# Patient Record
Sex: MALE | Race: BLACK OR AFRICAN AMERICAN
[De-identification: names, ages, dates, MRNs, and addresses within clinical notes are randomized per-mention and may not be internally consistent; named-entity substitution may affect disease eponyms.]

---

## 2018-09-27 ENCOUNTER — HOSPITAL ENCOUNTER (INPATIENT)
Dept: HOSPITAL 72 - EMR | Age: 73
LOS: 5 days | Discharge: HOME | DRG: 391 | End: 2018-10-02
Payer: MEDICARE

## 2018-09-27 VITALS — WEIGHT: 173 LBS | BODY MASS INDEX: 24.22 KG/M2 | HEIGHT: 71 IN

## 2018-09-27 VITALS — DIASTOLIC BLOOD PRESSURE: 109 MMHG | SYSTOLIC BLOOD PRESSURE: 197 MMHG

## 2018-09-27 VITALS — DIASTOLIC BLOOD PRESSURE: 100 MMHG | SYSTOLIC BLOOD PRESSURE: 165 MMHG

## 2018-09-27 VITALS — SYSTOLIC BLOOD PRESSURE: 165 MMHG | DIASTOLIC BLOOD PRESSURE: 93 MMHG

## 2018-09-27 VITALS — SYSTOLIC BLOOD PRESSURE: 162 MMHG | DIASTOLIC BLOOD PRESSURE: 96 MMHG

## 2018-09-27 VITALS — SYSTOLIC BLOOD PRESSURE: 160 MMHG | DIASTOLIC BLOOD PRESSURE: 102 MMHG

## 2018-09-27 VITALS — SYSTOLIC BLOOD PRESSURE: 192 MMHG | DIASTOLIC BLOOD PRESSURE: 109 MMHG

## 2018-09-27 DIAGNOSIS — M47.892: ICD-10-CM

## 2018-09-27 DIAGNOSIS — K59.00: ICD-10-CM

## 2018-09-27 DIAGNOSIS — R11.2: ICD-10-CM

## 2018-09-27 DIAGNOSIS — T62.91XA: ICD-10-CM

## 2018-09-27 DIAGNOSIS — F41.9: ICD-10-CM

## 2018-09-27 DIAGNOSIS — F32.9: ICD-10-CM

## 2018-09-27 DIAGNOSIS — Z98.1: ICD-10-CM

## 2018-09-27 DIAGNOSIS — N17.0: ICD-10-CM

## 2018-09-27 DIAGNOSIS — I10: ICD-10-CM

## 2018-09-27 DIAGNOSIS — Z79.02: ICD-10-CM

## 2018-09-27 DIAGNOSIS — I62.03: ICD-10-CM

## 2018-09-27 DIAGNOSIS — M50.10: ICD-10-CM

## 2018-09-27 DIAGNOSIS — J44.9: ICD-10-CM

## 2018-09-27 DIAGNOSIS — R10.9: ICD-10-CM

## 2018-09-27 DIAGNOSIS — Z86.010: ICD-10-CM

## 2018-09-27 DIAGNOSIS — K57.90: ICD-10-CM

## 2018-09-27 DIAGNOSIS — Z88.6: ICD-10-CM

## 2018-09-27 DIAGNOSIS — A08.4: Primary | ICD-10-CM

## 2018-09-27 DIAGNOSIS — E86.0: ICD-10-CM

## 2018-09-27 DIAGNOSIS — B20: ICD-10-CM

## 2018-09-27 LAB
ADD MANUAL DIFF: YES
ALBUMIN SERPL-MCNC: 4.7 G/DL (ref 3.4–5)
ALBUMIN/GLOB SERPL: 1 {RATIO} (ref 1–2.7)
ALP SERPL-CCNC: 108 U/L (ref 46–116)
ALT SERPL-CCNC: 27 U/L (ref 12–78)
ANION GAP SERPL CALC-SCNC: 9 MMOL/L (ref 5–15)
APPEARANCE UR: CLEAR
APTT BLD: 27 SEC (ref 23–33)
APTT PPP: (no result) S
AST SERPL-CCNC: 23 U/L (ref 15–37)
BILIRUB SERPL-MCNC: 0.6 MG/DL (ref 0.2–1)
BUN SERPL-MCNC: 13 MG/DL (ref 7–18)
CALCIUM SERPL-MCNC: 9.8 MG/DL (ref 8.5–10.1)
CHLORIDE SERPL-SCNC: 95 MMOL/L (ref 98–107)
CO2 SERPL-SCNC: 28 MMOL/L (ref 21–32)
CREAT SERPL-MCNC: 0.8 MG/DL (ref 0.55–1.3)
ERYTHROCYTE [DISTWIDTH] IN BLOOD BY AUTOMATED COUNT: 11.2 % (ref 11.6–14.8)
GLOBULIN SER-MCNC: 4.6 G/DL
GLUCOSE UR STRIP-MCNC: (no result) MG/DL
HCT VFR BLD CALC: 49.4 % (ref 42–52)
HGB BLD-MCNC: 16.4 G/DL (ref 14.2–18)
INR PPP: 1 (ref 0.9–1.1)
KETONES UR QL STRIP: (no result)
LEUKOCYTE ESTERASE UR QL STRIP: NEGATIVE
MCV RBC AUTO: 99 FL (ref 80–99)
NITRITE UR QL STRIP: NEGATIVE
PH UR STRIP: 7 [PH] (ref 4.5–8)
PLATELET # BLD: 234 K/UL (ref 150–450)
POTASSIUM SERPL-SCNC: 3.6 MMOL/L (ref 3.5–5.1)
PROT UR QL STRIP: (no result)
RBC # BLD AUTO: 4.99 M/UL (ref 4.7–6.1)
SODIUM SERPL-SCNC: 132 MMOL/L (ref 136–145)
SP GR UR STRIP: 1.01 (ref 1–1.03)
UROBILINOGEN UR-MCNC: NORMAL MG/DL (ref 0–1)
WBC # BLD AUTO: 10.8 K/UL (ref 4.8–10.8)

## 2018-09-27 PROCEDURE — 96361 HYDRATE IV INFUSION ADD-ON: CPT

## 2018-09-27 PROCEDURE — 80053 COMPREHEN METABOLIC PANEL: CPT

## 2018-09-27 PROCEDURE — 87081 CULTURE SCREEN ONLY: CPT

## 2018-09-27 PROCEDURE — 93005 ELECTROCARDIOGRAM TRACING: CPT

## 2018-09-27 PROCEDURE — 83880 ASSAY OF NATRIURETIC PEPTIDE: CPT

## 2018-09-27 PROCEDURE — 82150 ASSAY OF AMYLASE: CPT

## 2018-09-27 PROCEDURE — 86140 C-REACTIVE PROTEIN: CPT

## 2018-09-27 PROCEDURE — 85610 PROTHROMBIN TIME: CPT

## 2018-09-27 PROCEDURE — 83690 ASSAY OF LIPASE: CPT

## 2018-09-27 PROCEDURE — 80048 BASIC METABOLIC PNL TOTAL CA: CPT

## 2018-09-27 PROCEDURE — 99285 EMERGENCY DEPT VISIT HI MDM: CPT

## 2018-09-27 PROCEDURE — 83735 ASSAY OF MAGNESIUM: CPT

## 2018-09-27 PROCEDURE — 84484 ASSAY OF TROPONIN QUANT: CPT

## 2018-09-27 PROCEDURE — 85730 THROMBOPLASTIN TIME PARTIAL: CPT

## 2018-09-27 PROCEDURE — 82607 VITAMIN B-12: CPT

## 2018-09-27 PROCEDURE — 76700 US EXAM ABDOM COMPLETE: CPT

## 2018-09-27 PROCEDURE — 94664 DEMO&/EVAL PT USE INHALER: CPT

## 2018-09-27 PROCEDURE — 74176 CT ABD & PELVIS W/O CONTRAST: CPT

## 2018-09-27 PROCEDURE — 80061 LIPID PANEL: CPT

## 2018-09-27 PROCEDURE — 84550 ASSAY OF BLOOD/URIC ACID: CPT

## 2018-09-27 PROCEDURE — 85007 BL SMEAR W/DIFF WBC COUNT: CPT

## 2018-09-27 PROCEDURE — 36415 COLL VENOUS BLD VENIPUNCTURE: CPT

## 2018-09-27 PROCEDURE — 82977 ASSAY OF GGT: CPT

## 2018-09-27 PROCEDURE — 72148 MRI LUMBAR SPINE W/O DYE: CPT

## 2018-09-27 PROCEDURE — 96375 TX/PRO/DX INJ NEW DRUG ADDON: CPT

## 2018-09-27 PROCEDURE — 84443 ASSAY THYROID STIM HORMONE: CPT

## 2018-09-27 PROCEDURE — 83036 HEMOGLOBIN GLYCOSYLATED A1C: CPT

## 2018-09-27 PROCEDURE — 85025 COMPLETE CBC W/AUTO DIFF WBC: CPT

## 2018-09-27 PROCEDURE — 84100 ASSAY OF PHOSPHORUS: CPT

## 2018-09-27 PROCEDURE — 76770 US EXAM ABDO BACK WALL COMP: CPT

## 2018-09-27 PROCEDURE — 81003 URINALYSIS AUTO W/O SCOPE: CPT

## 2018-09-27 PROCEDURE — 96374 THER/PROPH/DIAG INJ IV PUSH: CPT

## 2018-09-27 PROCEDURE — 82550 ASSAY OF CK (CPK): CPT

## 2018-09-27 RX ADMIN — HEPARIN SODIUM SCH UNITS: 5000 INJECTION INTRAVENOUS; SUBCUTANEOUS at 20:20

## 2018-09-27 RX ADMIN — DEXTROSE AND SODIUM CHLORIDE SCH MLS/HR: 5; .45 INJECTION, SOLUTION INTRAVENOUS at 15:32

## 2018-09-27 NOTE — GENERAL PROGRESS NOTE
Assessment/Plan


Assessment/Plan


(1) Chronic subdural hematoma


(2) DDD (degenerative disc disease), lumbar


(3) DDD (degenerative disc disease), cervical


(4) Lumbar herniated disc


(5) Lumbar radiculopathy


(6) Lumbar spondylosis


(7) Cervical spondylosis


(8) Failed back surgical syndrome


Assessment & Plan:  Pt will continued on Dilaudid. Pt was d/w Dr. Dixon and 

he concurred.








Subjective


Date patient seen:  Sep 27, 2018


Time patient seen:  21:28


Allergies:  


Coded Allergies:  


     ACETAMINOPHEN (Verified  Allergy, Unknown, 3/24/10)


     ASPIRIN (Verified  Allergy, Unknown, 12/7/09)


     HYDROCODONE (Verified  Allergy, Unknown, 3/24/10)


     MORPHINE (Verified  Allergy, Unknown, 12/7/09)


Subjective


Constitutional:  Denies: chills, diaphoresis, fever, malaise, no symptoms, other

, weakness


HEENT:  Denies: blurred vision, double vision, ear discharge, ear pain, eye pain

, mouth pain, mouth swelling, no symptoms, nose congestion, nose pain, other, 

tearing, throat pain, throat swelling


Cardiovascular:  Denies: chest pain, edema, irregular heart rate, 

lightheadedness, no symptoms, other, palpitations, syncope


Respiratory:  Denies: SOB at rest, SOB with excertion, cough, no symptoms, 

orthopnea, other, shortness of breath, sputum, stridor, wheezing


Gastrointestinal/Abdominal:  Denies: abdomen distended, abdominal pain, black 

stools, blood in stool, constipated, diarrhea, difficulty swallowing, nausea, 

no symptoms, other, poor appetite, poor fluid intake, rectal bleeding, tarry 

stools, vomiting


Genitourinary:  Denies: burning, discharge, flank pain, frequency, hematuria, 

incontinence, no symptoms, other, pain, urgency


Neurologic/Psychiatric:  Denies: anxiety, depressed, emotional problems, 

headache, no symptoms, numbness, other, paresthesia, pre-existing deficit, 

seizure, tingling, tremors, weakness


Endocrine:  Denies: excessive sweating, flushing, increased hunger, increased 

thirst, increased urine, intolerance to cold, intolerance to heat, no symptoms, 

other, unexplained weight gain, unexplained weight loss


Hematologic/Lymphatic:  Denies: anemia, easy bleeding, easy bruising, no 

symptoms, other





Subjective


Pt is a known patient admitted under the care of Dr. Arreola with chronic pain. 

Started on Dilaudid 2mg IV Q3H PRN which pt is having adequate pain control on.





Objective





Last 24 Hour Vital Signs








  Date Time  Temp Pulse Resp B/P (MAP) Pulse Ox O2 Delivery O2 Flow Rate FiO2


 


9/27/18 17:08    165/93    


 


9/27/18 16:02 99.2       


 


9/27/18 16:00 99.2 80 20 165/93 (117) 99   





 99.2       


 


9/27/18 15:32 99.4       


 


9/27/18 14:21      Room Air  


 


9/27/18 14:19 99.4 82 19 165/100 (121) 99   





 99.4       


 


9/27/18 13:49      Room Air  


 


9/27/18 12:59 98.4 85 15 160/102 100 Room Air  





 98.4       


 


9/27/18 12:53 98.4 85 15 160/102 100 Room Air  





 98.4       


 


9/27/18 12:26    174/96    


 


9/27/18 12:13  88  192/113    


 


9/27/18 12:00 98.4 88 18 192/109 100 Room Air  





 98.4       


 


9/27/18 11:36  106  197/109    


 


9/27/18 11:13 98.4       


 


9/27/18 11:13 98.4       


 


9/27/18 11:13 98.4       


 


9/27/18 10:15 98.4       


 


9/27/18 10:05 98.4 106 18 197/109 98 Room Air  





 98.4       


 


9/27/18 09:28 98.5 115 18 199/109 98 Room Air  





 98.4       








Laboratory Tests


9/27/18 10:20: 


White Blood Count 10.8, Red Blood Count 4.99, Hemoglobin 16.4, Hematocrit 49.4, 

Mean Corpuscular Volume 99, Mean Corpuscular Hemoglobin 32.9H, Mean Corpuscular 

Hemoglobin Concent 33.2, Red Cell Distribution Width 11.2L, Platelet Count 234, 

Mean Platelet Volume 5.2L, Neutrophils (%) (Auto) , Lymphocytes (%) (Auto) , 

Monocytes (%) (Auto) , Eosinophils (%) (Auto) , Basophils (%) (Auto) , 

Differential Total Cells Counted 100, Neutrophils % (Manual) 89H, Lymphocytes % 

(Manual) 8L, Monocytes % (Manual) 3, Eosinophils % (Manual) 0, Basophils % (

Manual) 0, Band Neutrophils 0, Platelet Estimate Adequate, Platelet Morphology 

Normal, Red Blood Cell Morphology Normal, Prothrombin Time 10.1, Prothromb Time 

International Ratio 1.0, Activated Partial Thromboplast Time 27, Sodium Level 

132L, Potassium Level 3.6, Chloride Level 95L, Carbon Dioxide Level 28, Anion 

Gap 9, Blood Urea Nitrogen 13, Creatinine 0.8, Estimat Glomerular Filtration 

Rate , Glucose Level 145H, Calcium Level 9.8, Total Bilirubin 0.6, Aspartate 

Amino Transf (AST/SGOT) 23, Alanine Aminotransferase (ALT/SGPT) 27, Alkaline 

Phosphatase 108, Troponin I 0.038, Total Protein 9.3H, Albumin 4.7, Globulin 4.6

, Albumin/Globulin Ratio 1.0, Lipase 46L


9/27/18 12:15: 


Urine Color Pale yellow, Urine Appearance Clear, Urine pH 7, Urine Specific 

Gravity 1.010, Urine Protein 3+H, Urine Glucose (UA) 2+H, Urine Ketones 3+H, 

Urine Blood 5+H, Urine Nitrite Negative, Urine Bilirubin Negative, Urine 

Urobilinogen Normal, Urine Leukocyte Esterase Negative, Urine RBC 10-15H, Urine 

WBC 0, Urine Squamous Epithelial Cells None, Urine Bacteria None


Height (Feet):  5


Height (Inches):  11.00


Weight (Pounds):  173


Objective


General Appearance:  no apparent distress, alert


EENT:  PERRL/EOMI


Neck:  normal alignment, supple


Cardiovascular:  normal rate, regular rhythm


Respiratory/Chest:  lungs clear, normal breath sounds


Abdomen:  non tender, soft


Extremities:  non-tender


Neurologic:  alert, oriented x 3


Skin:  warm/dry











Jean Beltran Sep 27, 2018 21:25

## 2018-09-27 NOTE — EMERGENCY ROOM REPORT
History of Present Illness


General


Chief Complaint:  Vomiting


Source:  Patient, Medical Record





Present Illness


HPI


Patient presents emergency department today complaint generalized weakness, 

vomiting, decreased by mouth intake and abdominal pain.  Patient states that he 

has had symptoms like this in the past.  He is uncertain where this came from.  

He was told that he has history of gallstones.  Patient denies any fever chest 

pain or shortness of breath but appears very weak.  He states that he's really 

too weak to eat or walk.  His primary care physician is Dr. Arreola.  No other 

complaints are noted.  Symptoms noted to be severe.No other modifying factors.  

No other associated signs and symptoms.  No other complaints were noted.


Allergies:  


Coded Allergies:  


     ACETAMINOPHEN (Verified  Allergy, Unknown, 3/24/10)


     ASPIRIN (Verified  Allergy, Unknown, 12/7/09)


     HYDROCODONE (Verified  Allergy, Unknown, 3/24/10)


     MORPHINE (Verified  Allergy, Unknown, 12/7/09)





Patient History


Past Medical History:  HTN, COPD, HIV


Past Surgical History:  other - Back surgery, subdural hematoma


Pertinent Family History:  none


Social History:  Denies: smoking, alcohol use, drug use


Reviewed Nursing Documentation:  PMH: Agreed; PSxH: Agreed





Nursing Documentation-PMH


Past Medical History:  No History, Except For


Hx Cardiac Problems:  Yes - HIV


Hx Hypertension:  Yes


Hx Pacemaker:  No


Hx Asthma:  Yes


Hx COPD:  Yes


Hx Diabetes:  No


Hx Cancer:  No


Hx Gastrointestinal Problems:  No


Hx Dialysis:  No


Hx Neurological Problems:  Yes - Back surgery


Hx Cerebrovascular Accident:  Yes


Hx Dementia:  No


Hx Alzheimer's Disease:  No


Hx Parkinson's Disease:  No


Hx Seizures:  No


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


Hx Weakness:  Yes


Hx Fatigue:  Yes


Hx Brain Shunt:  Yes





Review of Systems


All Other Systems:  negative except mentioned in HPI





Physical Exam





Vital Signs








  Date Time  Temp Pulse Resp B/P (MAP) Pulse Ox O2 Delivery O2 Flow Rate FiO2


 


9/27/18 09:28 98.5 115 18 199/109 98 Room Air  





 98.4       








Sp02 EP Interpretation:  reviewed, normal


General Appearance:  alert, mild distress, lethargic, thin, Chronically Ill


Head:  normocephalic, atraumatic


Eyes:  bilateral eye normal inspection


ENT:  normal ENT inspection, hearing grossly normal, normal voice, other - Dry 

mucous membranes


Neck:  normal inspection, full range of motion, supple, no bony tend


Respiratory:  normal inspection, lungs clear, normal breath sounds, no 

respiratory distress, no retraction, no wheezing


Cardiovascular #1:  regular rate, rhythm, no edema


Gastrointestinal:  normal inspection, normal bowel sounds, no guarding, no 

hernia, other - tender diffusly


Genitourinary:  no CVA tenderness


Musculoskeletal:  normal inspection, back normal, normal range of motion


Neurologic:  normal inspection, alert, responsive, speech normal


Psychiatric:  judgement/insight normal, depressed affect, anxious


Skin:  normal inspection, normal color, no rash





Medical Decision Making


Diagnostic Impression:  


 Primary Impression:  


 Vomiting


 Additional Impressions:  


 Abdominal pain of unknown etiology


 Hypertension


 Intractable vomiting


ER Course


Patient presents to the emergency department today complaining of abdominal 

pain.  Differential considerations include acute pancreatitis, cholecystitis, 

gastritis, hepatitis, appendicitis just to name a few.  Given the severity of 

the patient's presentation I felt this is a highly complex patient.  This 

patient required extensive workup.  Patient laboratory workup was not 

impressive.  CT scan and pelvis is negative.  However given severe pain 

consistent vomiting and dehydration for the patient require admission to 

hospital.  Case discussed with Dr. Arreola for admission.





Patient also had elevated blood pressure that require repeated doses of 

labetalol and clonidine before was under control.  Because of his blood 

pressure elevation of felt the patient require monitoring for that as well.  

Because of multiple doses of IV antihypertensives I felt was a critical patient 

with hypertensive urgency..





Patient had a critical medical condition which untreated could potentially 

result in life or limb threatening injury.  Total critical care time excluding 

procedures was approximately 45 minutes.





Labs








Test


  9/27/18


10:20 9/27/18


12:15


 


White Blood Count


  10.8 K/UL


(4.8-10.8) 


 


 


Red Blood Count


  4.99 M/UL


(4.70-6.10) 


 


 


Hemoglobin


  16.4 G/DL


(14.2-18.0) 


 


 


Hematocrit


  49.4 %


(42.0-52.0) 


 


 


Mean Corpuscular Volume 99 FL (80-99)  


 


Mean Corpuscular Hemoglobin


  32.9 PG


(27.0-31.0) 


 


 


Mean Corpuscular Hemoglobin


Concent 33.2 G/DL


(32.0-36.0) 


 


 


Red Cell Distribution Width


  11.2 %


(11.6-14.8) 


 


 


Platelet Count


  234 K/UL


(150-450) 


 


 


Mean Platelet Volume


  5.2 FL


(6.5-10.1) 


 


 


Neutrophils (%) (Auto)  % (45.0-75.0)  


 


Lymphocytes (%) (Auto)  % (20.0-45.0)  


 


Monocytes (%) (Auto)  % (1.0-10.0)  


 


Eosinophils (%) (Auto)  % (0.0-3.0)  


 


Basophils (%) (Auto)  % (0.0-2.0)  


 


Differential Total Cells


Counted 100 


  


 


 


Neutrophils % (Manual) 89 % (45-75)  


 


Lymphocytes % (Manual) 8 % (20-45)  


 


Monocytes % (Manual) 3 % (1-10)  


 


Eosinophils % (Manual) 0 % (0-3)  


 


Basophils % (Manual) 0 % (0-2)  


 


Band Neutrophils 0 % (0-8)  


 


Platelet Estimate Adequate  


 


Platelet Morphology Normal  


 


Red Blood Cell Morphology Normal  


 


Prothrombin Time


  10.1 SEC


(9.30-11.50) 


 


 


Prothromb Time International


Ratio 1.0 (0.9-1.1) 


  


 


 


Activated Partial


Thromboplast Time 27 SEC (23-33) 


  


 


 


Sodium Level


  132 MMOL/L


(136-145) 


 


 


Potassium Level


  3.6 MMOL/L


(3.5-5.1) 


 


 


Chloride Level


  95 MMOL/L


() 


 


 


Carbon Dioxide Level


  28 MMOL/L


(21-32) 


 


 


Anion Gap


  9 mmol/L


(5-15) 


 


 


Blood Urea Nitrogen


  13 mg/dL


(7-18) 


 


 


Creatinine


  0.8 MG/DL


(0.55-1.30) 


 


 


Estimat Glomerular Filtration


Rate  mL/min (>60) 


  


 


 


Glucose Level


  145 MG/DL


() 


 


 


Calcium Level


  9.8 MG/DL


(8.5-10.1) 


 


 


Total Bilirubin


  0.6 MG/DL


(0.2-1.0) 


 


 


Aspartate Amino Transf


(AST/SGOT) 23 U/L (15-37) 


  


 


 


Alanine Aminotransferase


(ALT/SGPT) 27 U/L (12-78) 


  


 


 


Alkaline Phosphatase


  108 U/L


() 


 


 


Troponin I


  0.038 ng/mL


(0.000-0.056) 


 


 


Total Protein


  9.3 G/DL


(6.4-8.2) 


 


 


Albumin


  4.7 G/DL


(3.4-5.0) 


 


 


Globulin 4.6 g/dL  


 


Albumin/Globulin Ratio 1.0 (1.0-2.7)  


 


Lipase


  46 U/L


() 


 


 


Urine Color  Pale yellow 


 


Urine Appearance  Clear 


 


Urine pH  7 (4.5-8.0) 


 


Urine Specific Gravity


  


  1.010


(1.005-1.035)


 


Urine Protein  3+ (NEGATIVE) 


 


Urine Glucose (UA)  2+ (NEGATIVE) 


 


Urine Ketones  3+ (NEGATIVE) 


 


Urine Blood  5+ (NEGATIVE) 


 


Urine Nitrite


  


  Negative


(NEGATIVE)


 


Urine Bilirubin


  


  Negative


(NEGATIVE)


 


Urine Urobilinogen


  


  Normal MG/DL


(0.0-1.0)


 


Urine Leukocyte Esterase


  


  Negative


(NEGATIVE)


 


Urine RBC


  


  10-15 /HPF (0


- 0)


 


Urine WBC  0 /HPF (0 - 0) 


 


Urine Squamous Epithelial


Cells 


  None /LPF


(NONE/OCC)


 


Urine Bacteria


  


  None /HPF


(NONE)








EKG Diagnostic Results


Rate:  normal


Rhythm:  NSR


ST Segments:  no acute changes





Rhythm Strip Diag. Results


EP Interpretation:  yes


Rate:  80s


Rhythm:  NSR, no PVC's, no ectopy





CT/MRI/US Diagnostic Results


CT/MRI/US Diagnostic Results :  


   Imaging Test Ordered:  CT abd pelvis: Negative acute changes





Last Vital Signs








  Date Time  Temp Pulse Resp B/P (MAP) Pulse Ox O2 Delivery O2 Flow Rate FiO2


 


9/27/18 09:28 98.5 115 18 199/109 98 Room Air  





 98.4       








Status:  improved


Disposition:  ADMITTED AS INPATIENT


Condition:  Serious


Referrals:  


Adriana Arreola MD (PCP)











Nicolas August MD Sep 27, 2018 09:46

## 2018-09-27 NOTE — DIAGNOSTIC IMAGING REPORT
Indication: Abdominal pain

 

Technique: Continuous helical transaxial imaging of the abdomen and pelvis was

obtained from the lung bases to the pubic symphysis. No intravenous contrast was

administered. Coronal 2-D reformats were also obtained. Automatic Exposure Control

was utilized.

 

 

Total Dose length Product (DLP):  605.21 mGycm

 

CT Dose Index Volume (CTDIvol):   12.77 mGy

 

Comparison: none

 

Findings: Reticular densities at the lung bases likely scarring. There is mild

bronchiectasis present within the visualized lung bases. The spleen is normal in

size. Within the spleen there is a densely rim calcified 1.73 cm lesion probably a

calcified cyst. Tiny gallstone noted. Punctate calcifications in the left kidney

consistent with nonobstructive stones. Questionable tiny nonobstructive stone in the

right kidney. There is no hydronephrosis. Normal retrocecal appendix demonstrated.

Aortoiliac calcifications are present. There is a question of a right inguinal hernia

containing a small amount of fluid versus an undescended right testis. Please

correlate clinically. Few diverticula in the sigmoid colon demonstrated. No evidence

of acute diverticulitis. L4-5 and L5-S1 fusion demonstrated anteriorly.

 

IMPRESSION:

 

Nonobstructive stones in the left kidney and question of a tiny nonobstructive stone

in the right kidney.

 

Tiny gallstone

 

Atherosclerotic disease

 

Mild diverticulosis of the colon

 

Small right inguinal hernia containing fluid versus undescended testis.

 

Densely calcified benign-appearing splenic lesion.

 

Lower lumbar interbody fusion.

 

.

 

 

The CT scanner at [facility] is accredited by the [employer]

[employer] and the scans are performed using dose optimization techniques as

appropriate to a performed exam including Automatic Exposure control.

## 2018-09-27 NOTE — HISTORY AND PHYSICAL
History of Present Illness


General


Date patient seen:  Sep 27, 2018


Reason for Hospitalization:  Vomiting





Present Illness


HPI





73 year old male with hx of HTN, COPD, HIV, subdural hematoma presented to  

emergency department today with chife complaint of generalized weakness, 

vomiting, decreased mouth intake and abdominal pain.  Patient denies any fever 

chest pain or shortness of breath but appears very weak.  He states that he's 

really too weak to eat or walk.  Pt is admitted for intractable abdominal pain 

and poor oral intake and recent


Allergies:  


Coded Allergies:  


     ACETAMINOPHEN (Verified  Allergy, Unknown, 3/24/10)


     ASPIRIN (Verified  Allergy, Unknown, 12/7/09)


     HYDROCODONE (Verified  Allergy, Unknown, 3/24/10)


     MORPHINE (Verified  Allergy, Unknown, 12/7/09)





Medication History


Scheduled


Amoxicillin/Potassium Clav 875-125* (Augmentin 875-125 Tablet*), 1 TAB ORAL 

TWICE A DAY


Atenolol* (Tenormin*), 50 MG ORAL DAILY, (Reported)


Atenolol* (Tenormin*), 25 MG ORAL DAILY


Clopidogrel Bisulfate* (Plavix*), 75 MG ORAL DAILY


Diazepam* (Valium*), 10 MG PO DAILY, (Reported)


Emtricitabine/Tenofovir (Truvada 200 mg-300 mg Tablet), 1 TAB ORAL DAILY, (

Reported)


Levofloxacin* (Levaquin*), 500 MG ORAL DAILY


Nevirapine (Viramune), 200 MG PO DAILY, (Reported)


Oxycodone Hcl Er* (Oxycontin*), 80 MG ORAL BID, (Reported)


Promethazine Hcl* (Phenergan*), 25 MG ORAL Q6H





Scheduled PRN


Codeine/Promethazine Hcl* (Promethazine-Codeine Syrup*), 5 ML ORAL Q4H PRN for 

For Cough





Miscellaneous Medications


Oxycodone HCl/Acetaminophen (Percocet  mg Tablet), 1 EACH PO, (Reported)


Unable to Obtain Medications (Unable To Obtain Meds), (Reported)





Patient History


Healthcare decision maker





Resuscitation status





Advanced Directive on File








Past Medical/Surgical History


Past Medical/Surgical History:  


(1) Lumbar radiculopathy


(2) Cervical spondylosis


(3) Chronic subdural hematoma


(4) HIV disease


(5) Hypertension


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





Review of Systems


All Other Systems:  negative except mentioned in HPI





Physical Exam


General Appearance:  WD/WN, no apparent distress


Lines, tubes and drains:  peripheral, central line


HEENT:  normocephalic, atraumatic


Neck:  non-tender, supple


Respiratory/Chest:  chest wall non-tender, normal breath sounds


Breasts:  no masses


Cardiovascular/Chest:  normal peripheral pulses, normal rate





Last 24 Hour Vital Signs








  Date Time  Temp Pulse Resp B/P (MAP) Pulse Ox O2 Delivery O2 Flow Rate FiO2


 


9/27/18 12:59 98.4 85 15 160/102 100 Room Air  





 98.4       


 


9/27/18 12:53 98.4 85 15 160/102 100 Room Air  





 98.4       


 


9/27/18 12:26    174/96    


 


9/27/18 12:13  88  192/113    


 


9/27/18 12:00 98.4 88 18 192/109 100 Room Air  





 98.4       


 


9/27/18 11:36  106  197/109    


 


9/27/18 11:13 98.4       


 


9/27/18 11:13 98.4       


 


9/27/18 11:13 98.4       


 


9/27/18 10:15 98.4       


 


9/27/18 10:05 98.4 106 18 197/109 98 Room Air  





 98.4       


 


9/27/18 09:28 98.5 115 18 199/109 98 Room Air  





 98.4       











Laboratory Tests








Test


  9/27/18


10:20 9/27/18


12:15


 


White Blood Count


  10.8 K/UL


(4.8-10.8) 


 


 


Red Blood Count


  4.99 M/UL


(4.70-6.10) 


 


 


Hemoglobin


  16.4 G/DL


(14.2-18.0) 


 


 


Hematocrit


  49.4 %


(42.0-52.0) 


 


 


Mean Corpuscular Volume 99 FL (80-99)   


 


Mean Corpuscular Hemoglobin


  32.9 PG


(27.0-31.0)  H 


 


 


Mean Corpuscular Hemoglobin


Concent 33.2 G/DL


(32.0-36.0) 


 


 


Red Cell Distribution Width


  11.2 %


(11.6-14.8)  L 


 


 


Platelet Count


  234 K/UL


(150-450) 


 


 


Mean Platelet Volume


  5.2 FL


(6.5-10.1)  L 


 


 


Neutrophils (%) (Auto)


  % (45.0-75.0)


  


 


 


Lymphocytes (%) (Auto)


  % (20.0-45.0)


  


 


 


Monocytes (%) (Auto)  % (1.0-10.0)   


 


Eosinophils (%) (Auto)  % (0.0-3.0)   


 


Basophils (%) (Auto)  % (0.0-2.0)   


 


Differential Total Cells


Counted 100  


  


 


 


Neutrophils % (Manual) 89 % (45-75)  H 


 


Lymphocytes % (Manual) 8 % (20-45)  L 


 


Monocytes % (Manual) 3 % (1-10)   


 


Eosinophils % (Manual) 0 % (0-3)   


 


Basophils % (Manual) 0 % (0-2)   


 


Band Neutrophils 0 % (0-8)   


 


Platelet Estimate Adequate   


 


Platelet Morphology Normal   


 


Red Blood Cell Morphology Normal   


 


Prothrombin Time


  10.1 SEC


(9.30-11.50) 


 


 


Prothromb Time International


Ratio 1.0 (0.9-1.1)  


  


 


 


Activated Partial


Thromboplast Time 27 SEC (23-33)


  


 


 


Sodium Level


  132 MMOL/L


(136-145)  L 


 


 


Potassium Level


  3.6 MMOL/L


(3.5-5.1) 


 


 


Chloride Level


  95 MMOL/L


()  L 


 


 


Carbon Dioxide Level


  28 MMOL/L


(21-32) 


 


 


Anion Gap


  9 mmol/L


(5-15) 


 


 


Blood Urea Nitrogen


  13 mg/dL


(7-18) 


 


 


Creatinine


  0.8 MG/DL


(0.55-1.30) 


 


 


Estimat Glomerular Filtration


Rate  mL/min (>60)  


  


 


 


Glucose Level


  145 MG/DL


()  H 


 


 


Calcium Level


  9.8 MG/DL


(8.5-10.1) 


 


 


Total Bilirubin


  0.6 MG/DL


(0.2-1.0) 


 


 


Aspartate Amino Transf


(AST/SGOT) 23 U/L (15-37)


  


 


 


Alanine Aminotransferase


(ALT/SGPT) 27 U/L (12-78)


  


 


 


Alkaline Phosphatase


  108 U/L


() 


 


 


Troponin I


  0.038 ng/mL


(0.000-0.056) 


 


 


Total Protein


  9.3 G/DL


(6.4-8.2)  H 


 


 


Albumin


  4.7 G/DL


(3.4-5.0) 


 


 


Globulin 4.6 g/dL   


 


Albumin/Globulin Ratio 1.0 (1.0-2.7)   


 


Lipase


  46 U/L


()  L 


 


 


Urine Color  Pale yellow  


 


Urine Appearance  Clear  


 


Urine pH  7 (4.5-8.0)  


 


Urine Specific Gravity


  


  1.010


(1.005-1.035)


 


Urine Protein


  


  3+ (NEGATIVE)


H


 


Urine Glucose (UA)


  


  2+ (NEGATIVE)


H


 


Urine Ketones


  


  3+ (NEGATIVE)


H


 


Urine Blood


  


  5+ (NEGATIVE)


H


 


Urine Nitrite


  


  Negative


(NEGATIVE)


 


Urine Bilirubin


  


  Negative


(NEGATIVE)


 


Urine Urobilinogen


  


  Normal MG/DL


(0.0-1.0)


 


Urine Leukocyte Esterase


  


  Negative


(NEGATIVE)


 


Urine RBC


  


  10-15 /HPF (0


- 0)  H


 


Urine WBC


  


  0 /HPF (0 - 0)


 


 


Urine Squamous Epithelial


Cells 


  None /LPF


(NONE/OCC)


 


Urine Bacteria


  


  None /HPF


(NONE)











Microbiology








 Date/Time


Source Procedure


Growth Status


 


 


 9/27/18 00:00


Rectum  Ordered








Height (Feet):  5


Height (Inches):  11.00


Weight (Pounds):  173


Medications





Current Medications








 Medications


  (Trade)  Dose


 Ordered  Sig/Melania


 Route


 PRN Reason  Start Time


 Stop Time Status Last Admin


Dose Admin


 


 Al Hydroxide/Mg


 Hydroxide


  (Mylanta II)  30 ml  Q6H  PRN


 ORAL


 dyspepsia  9/27/18 12:00


 10/27/18 11:59   


 


 


 Atenolol


  (Tenormin)  25 mg  DAILY


 ORAL


   9/28/18 09:00


 10/28/18 08:59   


 


 


 Clopidogrel


 Bisulfate


  (Plavix)  75 mg  DAILY


 ORAL


   9/28/18 09:00


 10/28/18 08:59   


 


 


 Dextrose


  (Dextrose 50%)  25 ml  Q30M  PRN


 IV


 Hypoglycemia  9/27/18 12:00


 10/27/18 11:59   


 


 


 Dextrose


  (Dextrose 50%)  50 ml  Q30M  PRN


 IV


 Hypoglycemia  9/27/18 12:15


 10/27/18 12:14   


 


 


 Dextrose/Sodium


 Chloride  1,000 ml @ 


 75 mls/hr  [id number]


 IV


   9/27/18 11:51


 10/27/18 11:50   


 


 


 Diphenhydramine


 HCl


  (Benadryl)  25 mg  Q6H  PRN


 ORAL


 Itching/Pruritis  9/27/18 12:00


 10/27/18 11:59   


 


 


 Emtricitabine/


 Tenofovir


  (Truvada 200/


 300mg)  1 tab  DAILY


 ORAL


   9/28/18 09:00


 10/28/18 08:59 UNV  


 


 


 Heparin Sodium


  (Porcine)


  (Heparin 5000


 units/ml)  5,000 units  EVERY 12  HOURS


 SUBQ


   9/27/18 21:00


 10/27/18 20:59   


 


 


 Lorazepam


  (Ativan 2mg/ml


 1ml)  1 mg  Q4H  PRN


 IV


 agitation  9/27/18 12:00


 10/4/18 11:59   


 


 


 Metoclopramide HCl


  (Reglan)  10 mg  Q6H  PRN


 IVP


 severe nausea  9/27/18 12:00


 10/27/18 11:59   


 


 


 Nevirapine


  (Viramune)  200 mg  DAILY


 ORAL


   9/28/18 09:00


 10/28/18 08:59 UNV  


 


 


 Nitroglycerin


  (Ntg)  0.4 mg  Q5M X 3 DOSES PRN


 SL


 Prn Chest Pain  9/27/18 12:00


 10/27/18 11:59   


 


 


 Ondansetron HCl


  (Zofran)  4 mg  Q6H  PRN


 IVP


 Nausea & Vomiting  9/27/18 12:00


 10/27/18 11:59   


 


 


 Pantoprazole


  (Protonix)  40 mg  DAILY


 IV


   9/28/18 09:00


 10/28/18 08:59   


 


 


 Polyethylene


 Glycol


  (Miralax)  17 gm  HSPRN  PRN


 ORAL


 Constipation  9/27/18 12:39


 10/27/18 12:38   


 


 


 Promethazine HCl


  (Phenergan)  25 mg  Q6H  PRN


 IM


 REFRACTORY N/V  9/27/18 12:48


 10/27/18 12:47   


 


 


 Temazepam


  (Restoril)  15 mg  HSPRN  PRN


 ORAL


 Insomnia  9/27/18 12:00


 10/4/18 11:59   


 











Assessment/Plan


Problem List:  


(1) Chronic subdural hematoma


ICD Codes:  I62.03 - Nontraumatic chronic subdural hemorrhage


SNOMED:  55540521


(2) HIV disease


ICD Codes:  B20 - HIV disease


SNOMED:  20952267


(3) Intractable vomiting


ICD Codes:  R11.10 - Vomiting, unspecified


SNOMED:  759678182


(4) Abdominal pain of unknown etiology


ICD Codes:  R10.9 - Unspecified abdominal pain


SNOMED:  979791749


(5) Hypertension


ICD Codes:  I10 - Essential (primary) hypertension


SNOMED:  91569654


Assessment/Plan


npo


GI evaluation


iv fluids


symptomatic treatment


ID for HIV 


dvt prophylaxis.











Adriana Arreola MD Sep 27, 2018 14:06

## 2018-09-27 NOTE — GI INITIAL CONSULT NOTE
History of Present Illness


General


Date patient seen:  Sep 27, 2018


Time patient seen:  15:54


Reason for Hospitalization:  Vomiting


Referring physician:  BELLE GILBERT


Reason for Consultation:  ABDOMINAL PAIN





Present Illness


HPI


Patient presents emergency department today complaint generalized weakness, 

vomiting, decreased by mouth intake and abdominal pain.  Patient states that he 

has had symptoms like this in the past.  He is uncertain where this came from.  

He was told that he has history of gallstones.  Patient denies any fever chest 

pain or shortness of breath but appears very weak.  He states that he's really 

too weak to eat or walk.  His primary care physician is Dr. Arreola.  No other 

complaints are noted.  Symptoms noted to be severe.No other modifying factors.  

No other associated signs and symptoms.  No other complaints were noted.


GI consulted for abdominal pain, vomiting.   Pt seen, awake A&Ox4 NAD has c/o 

of severe lower abdominal pain.  States no urinary retention.  Mild tenderness 

to palpation.  Per patient, possible ate bad food which resulted in his 

abdominal pain.  In addition, has back pain as well. Denies any diarrhea, 

stated he did have a soft BM, however the pain continued after.  Labs reviewed; 

no anemia, no leukocytosis, normal lipase.   The patient has a history of 

colonoscopy back in 2016 noted with diverticulosis.


Home Meds


Active Scripts


Amoxicillin/Potassium Clav 875-125* (AUGMENTIN 875-125 TABLET*) 1 Each Tablet, 

1 TAB ORAL TWICE A DAY, #14 TAB


   Prov:Quincy Argueta MD         10/30/17


Clopidogrel Bisulfate* (PLAVIX*) 75 Mg Tablet, 75 MG ORAL DAILY, #30 TAB


   Prov:Natalie Govea NP         4/15/16


Atenolol* (TENORMIN*) 25 Mg Tablet, 25 MG ORAL DAILY, #30 TAB


   Prov:Natalie Govea NP         4/15/16


Levofloxacin* (LEVAQUIN*) 500 Mg Tablet, 500 MG ORAL DAILY, #5 TAB


   Prov:Natalie Govea NP         2/4/15


Codeine/Promethazine Hcl* (PROMETHAZINE-CODEINE SYRUP*) 5 Ml Udc, 5 ML ORAL Q4H 

PRN for For Cough, #12 OZ


   Prov:Natalie Govea NP         2/4/15


Promethazine Hcl* (PHENERGAN*) 25 Mg Tablet, 25 MG ORAL Q6H, #15 TAB 0 Refills


   Prov:Nicolas August MD         1/16/15


Reported Medications


Unable to Obtain Medications (UNABLE TO OBTAIN MEDS) 1 Ea Ea


   9/27/18


Oxycodone HCl/Acetaminophen (Percocet  mg Tablet) 1 Each Tablet, 1 EACH PO

, TAB


   10/30/17


Emtricitabine/Tenofovir (Truvada 200 mg-300 mg Tablet) 1 Tab Tab, 1 TAB ORAL 

DAILY


   6/8/13


Diazepam* (VALIUM*) 5 Mg Tablet, 10 MG PO DAILY, #21 TAB


   6/8/13


Atenolol* (TENORMIN*) 50 Mg Tablet, 50 MG ORAL DAILY


   6/8/13


Oxycodone Hcl Er* (OXYCONTIN*) 80 Mg Tab.er.12h, 80 MG ORAL BID, TAB


   6/8/13


Nevirapine (VIRAMUNE) 200 Mg Tablet, 200 MG PO DAILY


   10/31/12


Med list reviewed/reconciled:  Yes


Allergies:  


Coded Allergies:  


     ACETAMINOPHEN (Verified  Allergy, Unknown, 3/24/10)


     ASPIRIN (Verified  Allergy, Unknown, 12/7/09)


     HYDROCODONE (Verified  Allergy, Unknown, 3/24/10)


     MORPHINE (Verified  Allergy, Unknown, 12/7/09)





Patient History


History Provided By:  Patient, Medical Record


PMH Narrative


Past Medical History:  HTN, COPD, HIV


Past Surgical History:  other - Back surgery, subdural hematoma


Pertinent Family History:  none


Social History:  Denies: smoking, alcohol use, drug use


Reviewed Nursing Documentation:  PMH: Agreed; PSxH: Agreed





Nursing Documentation-PMH


Past Medical History:  No History, Except For


Hx Cardiac Problems:  Yes - HIV


Hx Hypertension:  Yes


Hx Pacemaker:  No


Hx Asthma:  Yes


Hx COPD:  Yes


Hx Diabetes:  No


Hx Cancer:  No


Hx Gastrointestinal Problems:  No


Hx Dialysis:  No


Hx Neurological Problems:  Yes - Back surgery


Hx Cerebrovascular Accident:  Yes


Hx Dementia:  No


Hx Alzheimer's Disease:  No


Hx Parkinson's Disease:  No


Hx Seizures:  No


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


Hx Weakness:  Yes


Hx Fatigue:  Yes


Hx Brain Shunt:  Yes


Social History:  Denies: smoking, alcohol use, drug use, other





Physical Exam





Vital Signs








  Date Time  Temp Pulse Resp B/P (MAP) Pulse Ox O2 Delivery O2 Flow Rate FiO2


 


9/27/18 09:28 98.5 115 18 199/109 98 Room Air  





 98.4       








Sp02 EP Interpretation:  reviewed, normal


Labs





Laboratory Tests








Test


  9/27/18


10:20 9/27/18


12:15


 


White Blood Count


  10.8 K/UL


(4.8-10.8) 


 


 


Red Blood Count


  4.99 M/UL


(4.70-6.10) 


 


 


Hemoglobin


  16.4 G/DL


(14.2-18.0) 


 


 


Hematocrit


  49.4 %


(42.0-52.0) 


 


 


Mean Corpuscular Volume 99 FL (80-99)   


 


Mean Corpuscular Hemoglobin


  32.9 PG


(27.0-31.0)  H 


 


 


Mean Corpuscular Hemoglobin


Concent 33.2 G/DL


(32.0-36.0) 


 


 


Red Cell Distribution Width


  11.2 %


(11.6-14.8)  L 


 


 


Platelet Count


  234 K/UL


(150-450) 


 


 


Mean Platelet Volume


  5.2 FL


(6.5-10.1)  L 


 


 


Neutrophils (%) (Auto)


  % (45.0-75.0)


  


 


 


Lymphocytes (%) (Auto)


  % (20.0-45.0)


  


 


 


Monocytes (%) (Auto)  % (1.0-10.0)   


 


Eosinophils (%) (Auto)  % (0.0-3.0)   


 


Basophils (%) (Auto)  % (0.0-2.0)   


 


Differential Total Cells


Counted 100  


  


 


 


Neutrophils % (Manual) 89 % (45-75)  H 


 


Lymphocytes % (Manual) 8 % (20-45)  L 


 


Monocytes % (Manual) 3 % (1-10)   


 


Eosinophils % (Manual) 0 % (0-3)   


 


Basophils % (Manual) 0 % (0-2)   


 


Band Neutrophils 0 % (0-8)   


 


Platelet Estimate Adequate   


 


Platelet Morphology Normal   


 


Red Blood Cell Morphology Normal   


 


Prothrombin Time


  10.1 SEC


(9.30-11.50) 


 


 


Prothromb Time International


Ratio 1.0 (0.9-1.1)  


  


 


 


Activated Partial


Thromboplast Time 27 SEC (23-33)


  


 


 


Sodium Level


  132 MMOL/L


(136-145)  L 


 


 


Potassium Level


  3.6 MMOL/L


(3.5-5.1) 


 


 


Chloride Level


  95 MMOL/L


()  L 


 


 


Carbon Dioxide Level


  28 MMOL/L


(21-32) 


 


 


Anion Gap


  9 mmol/L


(5-15) 


 


 


Blood Urea Nitrogen


  13 mg/dL


(7-18) 


 


 


Creatinine


  0.8 MG/DL


(0.55-1.30) 


 


 


Estimat Glomerular Filtration


Rate  mL/min (>60)  


  


 


 


Glucose Level


  145 MG/DL


()  H 


 


 


Calcium Level


  9.8 MG/DL


(8.5-10.1) 


 


 


Total Bilirubin


  0.6 MG/DL


(0.2-1.0) 


 


 


Aspartate Amino Transf


(AST/SGOT) 23 U/L (15-37)


  


 


 


Alanine Aminotransferase


(ALT/SGPT) 27 U/L (12-78)


  


 


 


Alkaline Phosphatase


  108 U/L


() 


 


 


Troponin I


  0.038 ng/mL


(0.000-0.056) 


 


 


Total Protein


  9.3 G/DL


(6.4-8.2)  H 


 


 


Albumin


  4.7 G/DL


(3.4-5.0) 


 


 


Globulin 4.6 g/dL   


 


Albumin/Globulin Ratio 1.0 (1.0-2.7)   


 


Lipase


  46 U/L


()  L 


 


 


Urine Color  Pale yellow  


 


Urine Appearance  Clear  


 


Urine pH  7 (4.5-8.0)  


 


Urine Specific Gravity


  


  1.010


(1.005-1.035)


 


Urine Protein


  


  3+ (NEGATIVE)


H


 


Urine Glucose (UA)


  


  2+ (NEGATIVE)


H


 


Urine Ketones


  


  3+ (NEGATIVE)


H


 


Urine Blood


  


  5+ (NEGATIVE)


H


 


Urine Nitrite


  


  Negative


(NEGATIVE)


 


Urine Bilirubin


  


  Negative


(NEGATIVE)


 


Urine Urobilinogen


  


  Normal MG/DL


(0.0-1.0)


 


Urine Leukocyte Esterase


  


  Negative


(NEGATIVE)


 


Urine RBC


  


  10-15 /HPF (0


- 0)  H


 


Urine WBC


  


  0 /HPF (0 - 0)


 


 


Urine Squamous Epithelial


Cells 


  None /LPF


(NONE/OCC)


 


Urine Bacteria


  


  None /HPF


(NONE)








General Appearance:  well appearing, no apparent distress, alert, thin


Head:  normocephalic


EENT:  PERRL/EOMI, normal ENT inspection


Neck:  supple


Respiratory:  normal breath sounds, no respiratory distress


Cardiovascular:  normal rate


Gastrointestinal:  normal inspection, non tender, soft, normal bowel sounds, non

-distended


Rectal:  deferred


Genitourinary:  deferred


Musculoskeletal:  normal inspection, back normal


Neurologic:  normal inspection, alert, oriented x3, responsive


Psychiatric:  normal inspection, judgement/insight normal, memory normal


Skin:  normal inspection, normal color, no rash, warm/dry, palpation normal, 

well hydrated


Lymphatic:  normal inspection, no adenopathy


Current Medications





Current Medications








 Medications


  (Trade)  Dose


 Ordered  Sig/Melania


 Route


 PRN Reason  Start Time


 Stop Time Status Last Admin


Dose Admin


 


 Al Hydroxide/Mg


 Hydroxide


  (Mylanta II)  30 ml  Q6H  PRN


 ORAL


 dyspepsia  9/27/18 12:00


 10/27/18 11:59   


 


 


 Atenolol


  (Tenormin)  25 mg  DAILY


 ORAL


   9/28/18 09:00


 10/28/18 08:59   


 


 


 Clonidine HCl


  (Catapres Tab)  0.1 mg  EVERY 6  HOURS


 ORAL


   9/27/18 18:00


 10/27/18 17:59   


 


 


 Clonidine HCl


  (Catapres Tab)  0.1 mg  Q6H  PRN


 ORAL


 For High Blood Pressure  9/27/18 15:30


 10/27/18 15:29   


 


 


 Clopidogrel


 Bisulfate


  (Plavix)  75 mg  DAILY


 ORAL


   9/28/18 09:00


 10/28/18 08:59   


 


 


 Dextrose


  (Dextrose 50%)  25 ml  Q30M  PRN


 IV


 Hypoglycemia  9/27/18 12:00


 10/27/18 11:59   


 


 


 Dextrose


  (Dextrose 50%)  50 ml  Q30M  PRN


 IV


 Hypoglycemia  9/27/18 12:15


 10/27/18 12:14   


 


 


 Dextrose/Sodium


 Chloride  1,000 ml @ 


 75 mls/hr  [id number]


 IV


   9/27/18 11:51


 10/27/18 11:50  9/27/18 15:32


 


 


 Diphenhydramine


 HCl


  (Benadryl)  25 mg  Q6H  PRN


 ORAL


 Itching/Pruritis  9/27/18 12:00


 10/27/18 11:59   


 


 


 Emtricitabine/


 Tenofovir


  (Truvada 200/


 300mg)  1 tab  DAILY


 ORAL


   9/28/18 09:00


 10/28/18 08:59 UNV  


 


 


 Heparin Sodium


  (Porcine)


  (Heparin 5000


 units/ml)  5,000 units  EVERY 12  HOURS


 SUBQ


   9/27/18 21:00


 10/27/18 20:59   


 


 


 Hydromorphone HCl


  (Dilaudid)  2 mg  Q3H  PRN


 IVP


 severe pain  9/27/18 14:15


 10/4/18 14:14  9/27/18 15:32


 


 


 Lorazepam


  (Ativan 2mg/ml


 1ml)  1 mg  Q4H  PRN


 IV


 agitation  9/27/18 12:00


 10/4/18 11:59   


 


 


 Metoclopramide HCl


  (Reglan)  10 mg  Q6H  PRN


 IVP


 severe nausea  9/27/18 12:00


 10/27/18 11:59   


 


 


 Nevirapine


  (Viramune)  200 mg  DAILY


 ORAL


   9/28/18 09:00


 10/28/18 08:59 UNV  


 


 


 Nitroglycerin


  (Ntg)  0.4 mg  Q5M X 3 DOSES PRN


 SL


 Prn Chest Pain  9/27/18 12:00


 10/27/18 11:59   


 


 


 Ondansetron HCl


  (Zofran)  4 mg  Q6H  PRN


 IVP


 Nausea & Vomiting  9/27/18 12:00


 10/27/18 11:59   


 


 


 Pantoprazole


  (Protonix)  40 mg  DAILY


 IV


   9/28/18 09:00


 10/28/18 08:59   


 


 


 Polyethylene


 Glycol


  (Miralax)  17 gm  HSPRN  PRN


 ORAL


 Constipation  9/27/18 12:39


 10/27/18 12:38   


 


 


 Promethazine HCl


  (Phenergan)  25 mg  Q6H  PRN


 IM


 REFRACTORY N/V  9/27/18 12:48


 10/27/18 12:47   


 


 


 Temazepam


  (Restoril)  15 mg  HSPRN  PRN


 ORAL


 Insomnia  9/27/18 12:00


 10/4/18 11:59   


 











GI: Plan


Problems:  


(1) Gastroenteritis


(2) Dehydration


(3) Abdominal pain of unknown etiology


(4) Vomiting


Plan


symptomatic treatment at this time


GI cocktail prn


zofran prn


CLD, adv as tolerated


ppi


pain mgmt


patient is on plavix


fu labs





Discussed with Dr. Ramirez.


Thank you for this patient referral, we will follow.





The patient was seen and examined at bedside and all new and available data was 

reviewed in the patients chart. I agree with the above findings, impression 

and plan.  (Patient seen earlier today. Signature stamp does not reflect 

patient encounter time.). - MD Randee Daniels,[facility] NP Sep 27, 2018 15:58

## 2018-09-28 VITALS — DIASTOLIC BLOOD PRESSURE: 106 MMHG | SYSTOLIC BLOOD PRESSURE: 163 MMHG

## 2018-09-28 VITALS — DIASTOLIC BLOOD PRESSURE: 76 MMHG | SYSTOLIC BLOOD PRESSURE: 114 MMHG

## 2018-09-28 VITALS — SYSTOLIC BLOOD PRESSURE: 114 MMHG | DIASTOLIC BLOOD PRESSURE: 83 MMHG

## 2018-09-28 VITALS — DIASTOLIC BLOOD PRESSURE: 69 MMHG | SYSTOLIC BLOOD PRESSURE: 119 MMHG

## 2018-09-28 VITALS — SYSTOLIC BLOOD PRESSURE: 116 MMHG | DIASTOLIC BLOOD PRESSURE: 74 MMHG

## 2018-09-28 VITALS — DIASTOLIC BLOOD PRESSURE: 66 MMHG | SYSTOLIC BLOOD PRESSURE: 102 MMHG

## 2018-09-28 LAB
ADD MANUAL DIFF: NO
ALBUMIN SERPL-MCNC: 3.8 G/DL (ref 3.4–5)
ALBUMIN/GLOB SERPL: 0.9 {RATIO} (ref 1–2.7)
ALP SERPL-CCNC: 89 U/L (ref 46–116)
ALT SERPL-CCNC: 30 U/L (ref 12–78)
AMYLASE SERPL-CCNC: 34 U/L (ref 25–115)
ANION GAP SERPL CALC-SCNC: 9 MMOL/L (ref 5–15)
AST SERPL-CCNC: 41 U/L (ref 15–37)
BASOPHILS NFR BLD AUTO: 0.2 % (ref 0–2)
BILIRUB SERPL-MCNC: 0.7 MG/DL (ref 0.2–1)
BUN SERPL-MCNC: 20 MG/DL (ref 7–18)
CALCIUM SERPL-MCNC: 9.1 MG/DL (ref 8.5–10.1)
CHLORIDE SERPL-SCNC: 98 MMOL/L (ref 98–107)
CO2 SERPL-SCNC: 26 MMOL/L (ref 21–32)
CREAT SERPL-MCNC: 1.1 MG/DL (ref 0.55–1.3)
EOSINOPHIL NFR BLD AUTO: 0 % (ref 0–3)
ERYTHROCYTE [DISTWIDTH] IN BLOOD BY AUTOMATED COUNT: 11.7 % (ref 11.6–14.8)
GLOBULIN SER-MCNC: 4.3 G/DL
HCT VFR BLD CALC: 48.1 % (ref 42–52)
HGB BLD-MCNC: 16.3 G/DL (ref 14.2–18)
LYMPHOCYTES NFR BLD AUTO: 14.4 % (ref 20–45)
MCV RBC AUTO: 100 FL (ref 80–99)
MONOCYTES NFR BLD AUTO: 6.9 % (ref 1–10)
NEUTROPHILS NFR BLD AUTO: 78.5 % (ref 45–75)
PLATELET # BLD: 210 K/UL (ref 150–450)
POTASSIUM SERPL-SCNC: 4.3 MMOL/L (ref 3.5–5.1)
RBC # BLD AUTO: 4.83 M/UL (ref 4.7–6.1)
SODIUM SERPL-SCNC: 133 MMOL/L (ref 136–145)
WBC # BLD AUTO: 11.9 K/UL (ref 4.8–10.8)

## 2018-09-28 RX ADMIN — DEXTROSE AND SODIUM CHLORIDE SCH MLS/HR: 5; .45 INJECTION, SOLUTION INTRAVENOUS at 15:42

## 2018-09-28 RX ADMIN — DEXTROSE AND SODIUM CHLORIDE SCH MLS/HR: 5; .45 INJECTION, SOLUTION INTRAVENOUS at 01:17

## 2018-09-28 RX ADMIN — HEPARIN SODIUM SCH UNITS: 5000 INJECTION INTRAVENOUS; SUBCUTANEOUS at 09:02

## 2018-09-28 RX ADMIN — PREGABALIN SCH MG: 25 CAPSULE ORAL at 17:16

## 2018-09-28 RX ADMIN — VALSARTAN SCH EA: 160 TABLET ORAL at 09:15

## 2018-09-28 RX ADMIN — HEPARIN SODIUM SCH UNITS: 5000 INJECTION INTRAVENOUS; SUBCUTANEOUS at 20:21

## 2018-09-28 RX ADMIN — ATENOLOL SCH MG: 25 TABLET ORAL at 09:02

## 2018-09-28 NOTE — DIAGNOSTIC IMAGING REPORT
Indication: Abdominal pain

 

Technique: Gray-scale and duplex images of the upper abdomen were obtained

 

Comparison: 1/27/2011. Also abdomen pelvis CT performed earlier the same day

 

Findings: Gallbladder demonstrates a gallstone. No gallbladder wall thickening nor

pericholecystic fluid.  Sonographic Hammer's sign is negative.  Common bile duct

measures 5 mm in diameter.  No intrahepatic biliary ductal dilatation.  Liver

demonstrates normal echogenicity, no focal abnormality.   Portal vein and hepatic

veins are patent on color Doppler imaging.   Pancreas is unremarkable.   The spleen

demonstrates a rim calcified lesion measuring 16 mm diameter. This is also

demonstrated on the previous exam as well as recent CT  Left kidney measures 11.5 cm

in length.  Right kidney measures 10.8 cm length.  Both kidneys demonstrate normal

echogenicity.  Both kidneys demonstrate renal sinus calcifications, also demonstrated

on recent CT.   No hydronephrosis . Non-aneurysmal abdominal aorta .

 

Impression: Cholelithiasis. Negative for dilated ducts

 

Bilateral nonobstructive nephrolithiasis, also previously reported

 

Rim calcified splenic lesion, also demonstrated on multiple prior studies, most

likely postinflammatory in nature.

## 2018-09-28 NOTE — PULMONOLOGY PROGRESS NOTE
Assessment/Plan


Problems:  


(1) Intractable back pain


(2) Intractable vomiting


(3) Abdominal pain of unknown etiology


(4) Hypertension


(5) HIV disease


(6) Chronic subdural hematoma


Assessment/Plan


npo


ivf


symptomatic treatment


GI evaluation


L spine


pain management


ID to see for HIV





Subjective


ROS Limited/Unobtainable:  No


Constitutional:  Reports: no symptoms


HEENT:  Repors: no symptoms


Respiratory:  Reports: no symptoms


Allergies:  


Coded Allergies:  


     ACETAMINOPHEN (Verified  Allergy, Unknown, 3/24/10)


     ASPIRIN (Verified  Allergy, Unknown, 12/7/09)


     HYDROCODONE (Verified  Allergy, Unknown, 3/24/10)


     MORPHINE (Verified  Allergy, Unknown, 12/7/09)





Objective





Last 24 Hour Vital Signs








  Date Time  Temp Pulse Resp B/P (MAP) Pulse Ox O2 Delivery O2 Flow Rate FiO2


 


9/28/18 12:45 97.8       


 


9/28/18 12:15 97.8       


 


9/28/18 12:15    119/69    


 


9/28/18 12:00 97.8 55 20 119/69 (86) 98   





 97.8       


 


9/28/18 09:05 98.6       


 


9/28/18 09:02  74  114/76    


 


9/28/18 09:00      Room Air  


 


9/28/18 08:00 98.6 74 18 114/76 (89) 98   





 98.6       


 


9/28/18 05:48    114/83    


 


9/28/18 04:00 98.5 72 18 114/83 (93) 99   





 98.5       


 


9/28/18 00:00 99.5 92 18 163/106 (125) 100   





 99.5       


 


9/27/18 23:21    163/103    


 


9/27/18 21:00      Room Air  


 


9/27/18 20:00 98.6 84 18 162/96 (118) 96   





 98.6       


 


9/27/18 17:08    165/93    


 


9/27/18 16:00 99.2 80 20 165/93 (117) 99   





 99.2       


 


9/27/18 15:32 99.4       


 


9/27/18 14:21      Room Air  


 


9/27/18 14:19 99.4 82 19 165/100 (121) 99   





 99.4       


 


9/27/18 13:49      Room Air  


 


9/27/18 12:59 98.4 85 15 160/102 100 Room Air  





 98.4       


 


9/27/18 12:53 98.4 85 15 160/102 100 Room Air  





 98.4       

















Intake and Output  


 


 9/27/18 9/28/18





 19:00 07:00


 


Intake Total 0 ml 375 ml


 


Balance 0 ml 375 ml


 


  


 


Intake Oral 0 ml 


 


IV Total  375 ml








General Appearance:  WD/WN


HEENT:  normocephalic, atraumatic


Respiratory/Chest:  chest wall non-tender, lungs clear


Cardiovascular:  normal peripheral pulses, normal rate


Abdomen:  normal bowel sounds, soft, non tender


Genitourinary:  normal external genitalia


Extremities:  no clubbing


Skin:  no lesions


Neurologic/Psychiatric:  CNs II-XII grossly normal, alert


Lymphatic:  no neck adenopathy





Microbiology








 Date/Time


Source Procedure


Growth Status


 


 


 9/27/18 20:50


Rectum  Received








Laboratory Tests


9/28/18 05:45: 


White Blood Count 11.9H, Red Blood Count 4.83, Hemoglobin 16.3, Hematocrit 48.1

, Mean Corpuscular Volume 100H, Mean Corpuscular Hemoglobin 33.8H, Mean 

Corpuscular Hemoglobin Concent 33.9, Red Cell Distribution Width 11.7, Platelet 

Count 210, Mean Platelet Volume 5.2L, Neutrophils (%) (Auto) 78.5H, Lymphocytes 

(%) (Auto) 14.4L, Monocytes (%) (Auto) 6.9, Eosinophils (%) (Auto) 0.0, 

Basophils (%) (Auto) 0.2, Activated Partial Thromboplast Time 30, Sodium Level 

133L, Potassium Level 4.3, Chloride Level 98, Carbon Dioxide Level 26, Anion 

Gap 9, Blood Urea Nitrogen 20H, Creatinine 1.1, Estimat Glomerular Filtration 

Rate , Glucose Level 99, Calcium Level 9.1, Total Bilirubin 0.7, Aspartate 

Amino Transf (AST/SGOT) 41H, Alanine Aminotransferase (ALT/SGPT) 30, Alkaline 

Phosphatase 89, Total Protein 8.1, Albumin 3.8, Globulin 4.3, Albumin/Globulin 

Ratio 0.9L, Amylase Level 34, Lipase 52L





Current Medications








 Medications


  (Trade)  Dose


 Ordered  Sig/Melania


 Route


 PRN Reason  Start Time


 Stop Time Status Last Admin


Dose Admin


 


 Atenolol


  (Tenormin)  25 mg  DAILY


 ORAL


   9/28/18 09:00


 10/28/18 08:59  9/28/18 09:02


 


 


 Clonidine HCl


  (Catapres Tab)  0.1 mg  EVERY 6  HOURS


 ORAL


   9/27/18 18:00


 10/27/18 17:59  9/28/18 12:15


 


 


 Clonidine HCl


  (Catapres Tab)  0.1 mg  Q6H  PRN


 ORAL


 For High Blood Pressure  9/27/18 15:30


 10/27/18 15:29   


 


 


 Clopidogrel


 Bisulfate


  (Plavix)  75 mg  DAILY


 ORAL


   9/28/18 09:00


 10/28/18 08:59  9/28/18 09:02


 


 


 Dextrose


  (Dextrose 50%)  25 ml  Q30M  PRN


 IV


 Hypoglycemia  9/27/18 12:00


 10/27/18 11:59   


 


 


 Dextrose


  (Dextrose 50%)  50 ml  Q30M  PRN


 IV


 Hypoglycemia  9/27/18 12:15


 10/27/18 12:14   


 


 


 Dextrose/Sodium


 Chloride  1,000 ml @ 


 75 mls/hr  [id number]


 IV


   9/27/18 11:51


 10/27/18 11:50  9/28/18 01:17


 


 


 Diphenhydramine


 HCl


  (Benadryl)  25 mg  Q6H  PRN


 ORAL


 Itching/Pruritis  9/27/18 12:00


 10/27/18 11:59   


 


 


 Heparin Sodium


  (Porcine)


  (Heparin 5000


 units/ml)  5,000 units  EVERY 12  HOURS


 SUBQ


   9/27/18 21:00


 10/27/18 20:59  9/28/18 09:02


 


 


 Hydromorphone HCl


  (Dilaudid)  3 mg  Q3H  PRN


 IVP


 severe pain  9/28/18 14:15


 10/4/18 14:14   


 


 


 Lorazepam


  (Ativan 2mg/ml


 1ml)  1 mg  Q4H  PRN


 IV


 agitation  9/27/18 12:00


 10/4/18 11:59   


 


 


 Metoclopramide HCl


  (Reglan)  10 mg  Q6H  PRN


 IVP


 severe nausea  9/27/18 12:00


 10/27/18 11:59   


 


 


 Nitroglycerin


  (Ntg)  0.4 mg  Q5M X 3 DOSES PRN


 SL


 Prn Chest Pain  9/27/18 12:00


 10/27/18 11:59   


 


 


 Ondansetron HCl


  (Zofran)  4 mg  Q6H  PRN


 IVP


 Nausea & Vomiting  9/27/18 12:00


 10/27/18 11:59   


 


 


 Pantoprazole


  (Protonix)  40 mg  DAILY


 IV


   9/28/18 09:00


 10/28/18 08:59  9/28/18 09:02


 


 


 Patient Own


 Medication


  (Patient's Own


 Med)  1 ea  DAILY


 ORAL


   9/28/18 09:00


 10/28/18 08:59  9/28/18 09:15


 


 


 Patient Own


 Medication


  (Patient's Own


 Med)  1 ea  DAILY


 ORAL


   9/28/18 09:00


 10/28/18 08:59  9/28/18 09:15


 


 


 Polyethylene


 Glycol


  (Miralax)  17 gm  HSPRN  PRN


 ORAL


 Constipation  9/27/18 12:39


 10/27/18 12:38   


 


 


 Promethazine HCl


  (Phenergan)  25 mg  Q6H  PRN


 IM


 REFRACTORY N/V  9/27/18 12:48


 10/27/18 12:47   


 


 


 Temazepam


  (Restoril)  15 mg  HSPRN  PRN


 ORAL


 Insomnia  9/27/18 12:00


 10/4/18 11:59   


 

















Adriana Arreola MD Sep 28, 2018 12:50

## 2018-09-28 NOTE — CONSULTATION
History of Present Illness


General


Date patient seen:  Sep 27, 2018


Chief Complaint:  Vomiting


Referring physician:  BELLE GILBERT


Reason for Consultation:  HIV





Present Illness


HPI


4 yo male with PMHx of HIV, HTN, COPD who presented to the ED on 9/27/18 with 

vomiting and abdominal pain. the pt has anxiety and insomnia. the pt has hx of 

depression. no si/hi


Allergies:  


Coded Allergies:  


     ACETAMINOPHEN (Verified  Allergy, Unknown, 3/24/10)


     ASPIRIN (Verified  Allergy, Unknown, 12/7/09)


     HYDROCODONE (Verified  Allergy, Unknown, 3/24/10)


     MORPHINE (Verified  Allergy, Unknown, 12/7/09)





Medication History


Scheduled


Amoxicillin/Potassium Clav 875-125* (Augmentin 875-125 Tablet*), 1 TAB ORAL 

TWICE A DAY


Atenolol* (Tenormin*), 50 MG ORAL DAILY, (Reported)


Atenolol* (Tenormin*), 25 MG ORAL DAILY


Clopidogrel Bisulfate* (Plavix*), 75 MG ORAL DAILY


Diazepam* (Valium*), 10 MG PO DAILY, (Reported)


Emtricitabine/Tenofovir (Truvada 200 mg-300 mg Tablet), 1 TAB ORAL DAILY, (

Reported)


Levofloxacin* (Levaquin*), 500 MG ORAL DAILY


Nevirapine (Viramune), 200 MG PO DAILY, (Reported)


Oxycodone Hcl Er* (Oxycontin*), 80 MG ORAL BID, (Reported)


Promethazine Hcl* (Phenergan*), 25 MG ORAL Q6H





Scheduled PRN


Codeine/Promethazine Hcl* (Promethazine-Codeine Syrup*), 5 ML ORAL Q4H PRN for 

For Cough





Miscellaneous Medications


Oxycodone HCl/Acetaminophen (Percocet  mg Tablet), 1 EACH PO, (Reported)


Unable to Obtain Medications (Unable To Obtain Meds), (Reported)





Patient History


Limited by:  medical condition


History Provided By:  Patient, Medical Record


Healthcare decision maker





Resuscitation status


Full Code


Advanced Directive on File


No





Past Medical/Surgical History


Past Medical/Surgical History:  


(1) Vomiting


(2) Dehydration


(3) Gastroenteritis


(4) Abdominal pain of unknown etiology


(5) Injury, spinal cord


(6) Cerebrovascular accident (CVA)


(7) Hypertension


(8) Lumbar radiculopathy


(9) Lumbar spondylosis


(10) DDD (degenerative disc disease), cervical


(11) DDD (degenerative disc disease), lumbar


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


(13) Elevated serum GGT level


(14) Diverticulosis


(15) History of colon polyps


(16) probably ischemic R MCA strokevs. TIA


(17) Pneumonia


(18) Acute renal failure


(19) Acute encephalopathy


(20) Dental abscess


(21) Intractable back pain


(22) Hypotension


(23) Intractable vomiting


(24) HIV disease


(25) Chronic subdural hematoma





Review of Systems


Psychiatric:  Reports: anxiety, depressed feelings, emotional problems





Physical Exam


General Appearance:  no apparent distress, alert


Neurologic:  oriented x 3, responsive, depressed affect





Last 24 Hour Vital Signs








  Date Time  Temp Pulse Resp B/P (MAP) Pulse Ox O2 Delivery O2 Flow Rate FiO2


 


9/28/18 13:59 97.8       


 


9/28/18 13:29 97.8       


 


9/28/18 12:45 97.8       


 


9/28/18 12:15 97.8       


 


9/28/18 12:15    119/69    


 


9/28/18 12:00 97.8 55 20 119/69 (86) 98   





 97.8       


 


9/28/18 09:05 98.6       


 


9/28/18 09:02  74  114/76    


 


9/28/18 09:00      Room Air  


 


9/28/18 08:00 98.6 74 18 114/76 (89) 98   





 98.6       


 


9/28/18 05:48    114/83    


 


9/28/18 04:00 98.5 72 18 114/83 (93) 99   





 98.5       


 


9/28/18 00:00 99.5 92 18 163/106 (125) 100   





 99.5       


 


9/27/18 23:21    163/103    


 


9/27/18 21:00      Room Air  


 


9/27/18 20:00 98.6 84 18 162/96 (118) 96   





 98.6       


 


9/27/18 17:08    165/93    


 


9/27/18 16:00 99.2 80 20 165/93 (117) 99   





 99.2       

















Intake and Output  


 


 9/27/18 9/28/18





 19:00 07:00


 


Intake Total 0 ml 375 ml


 


Balance 0 ml 375 ml


 


  


 


Intake Oral 0 ml 


 


IV Total  375 ml











Laboratory Tests








Test


  9/28/18


05:45


 


White Blood Count


  11.9 K/UL


(4.8-10.8)  H


 


Red Blood Count


  4.83 M/UL


(4.70-6.10)


 


Hemoglobin


  16.3 G/DL


(14.2-18.0)


 


Hematocrit


  48.1 %


(42.0-52.0)


 


Mean Corpuscular Volume


  100 FL (80-99)


H


 


Mean Corpuscular Hemoglobin


  33.8 PG


(27.0-31.0)  H


 


Mean Corpuscular Hemoglobin


Concent 33.9 G/DL


(32.0-36.0)


 


Red Cell Distribution Width


  11.7 %


(11.6-14.8)


 


Platelet Count


  210 K/UL


(150-450)


 


Mean Platelet Volume


  5.2 FL


(6.5-10.1)  L


 


Neutrophils (%) (Auto)


  78.5 %


(45.0-75.0)  H


 


Lymphocytes (%) (Auto)


  14.4 %


(20.0-45.0)  L


 


Monocytes (%) (Auto)


  6.9 %


(1.0-10.0)


 


Eosinophils (%) (Auto)


  0.0 %


(0.0-3.0)


 


Basophils (%) (Auto)


  0.2 %


(0.0-2.0)


 


Activated Partial


Thromboplast Time 30 SEC (23-33)


 


 


Sodium Level


  133 MMOL/L


(136-145)  L


 


Potassium Level


  4.3 MMOL/L


(3.5-5.1)


 


Chloride Level


  98 MMOL/L


()


 


Carbon Dioxide Level


  26 MMOL/L


(21-32)


 


Anion Gap


  9 mmol/L


(5-15)


 


Blood Urea Nitrogen


  20 mg/dL


(7-18)  H


 


Creatinine


  1.1 MG/DL


(0.55-1.30)


 


Estimat Glomerular Filtration


Rate  mL/min (>60)  


 


 


Glucose Level


  99 MG/DL


()


 


Calcium Level


  9.1 MG/DL


(8.5-10.1)


 


Total Bilirubin


  0.7 MG/DL


(0.2-1.0)


 


Aspartate Amino Transf


(AST/SGOT) 41 U/L (15-37)


H


 


Alanine Aminotransferase


(ALT/SGPT) 30 U/L (12-78)


 


 


Alkaline Phosphatase


  89 U/L


()


 


Total Protein


  8.1 G/DL


(6.4-8.2)


 


Albumin


  3.8 G/DL


(3.4-5.0)


 


Globulin 4.3 g/dL  


 


Albumin/Globulin Ratio


  0.9 (1.0-2.7)


L


 


Amylase Level


  34 U/L


()


 


Lipase


  52 U/L


()  L











Microbiology








 Date/Time


Source Procedure


Growth Status


 


 


 9/27/18 20:50


Rectum  Received








Height (Feet):  5


Height (Inches):  11.00


Weight (Pounds):  173


Medications





Current Medications








 Medications


  (Trade)  Dose


 Ordered  Sig/Melania


 Route


 PRN Reason  Start Time


 Stop Time Status Last Admin


Dose Admin


 


 Atenolol


  (Tenormin)  25 mg  DAILY


 ORAL


   9/28/18 09:00


 10/28/18 08:59  9/28/18 09:02


 


 


 Clonidine HCl


  (Catapres Tab)  0.1 mg  EVERY 6  HOURS


 ORAL


   9/27/18 18:00


 10/27/18 17:59  9/28/18 12:15


 


 


 Clonidine HCl


  (Catapres Tab)  0.1 mg  Q6H  PRN


 ORAL


 For High Blood Pressure  9/27/18 15:30


 10/27/18 15:29   


 


 


 Clopidogrel


 Bisulfate


  (Plavix)  75 mg  DAILY


 ORAL


   9/28/18 09:00


 10/28/18 08:59  9/28/18 09:02


 


 


 Cyclobenzaprine


 HCl


  (Flexeril)  10 mg  TIDPRN  PRN


 ORAL


 Muscle Spasm  9/28/18 15:00


 10/28/18 14:59   


 


 


 Dextrose


  (Dextrose 50%)  25 ml  Q30M  PRN


 IV


 Hypoglycemia  9/27/18 12:00


 10/27/18 11:59   


 


 


 Dextrose


  (Dextrose 50%)  50 ml  Q30M  PRN


 IV


 Hypoglycemia  9/27/18 12:15


 10/27/18 12:14   


 


 


 Dextrose/Sodium


 Chloride  1,000 ml @ 


 75 mls/hr  [id number]


 IV


   9/27/18 11:51


 10/27/18 11:50  9/28/18 01:17


 


 


 Diphenhydramine


 HCl


  (Benadryl)  25 mg  Q6H  PRN


 ORAL


 Itching/Pruritis  9/27/18 12:00


 10/27/18 11:59   


 


 


 Heparin Sodium


  (Porcine)


  (Heparin 5000


 units/ml)  5,000 units  EVERY 12  HOURS


 SUBQ


   9/27/18 21:00


 10/27/18 20:59  9/28/18 09:02


 


 


 Hydromorphone HCl


  (Dilaudid)  3 mg  Q3H  PRN


 IVP


 severe pain  9/28/18 14:15


 10/4/18 14:14   


 


 


 Lorazepam


  (Ativan 2mg/ml


 1ml)  1 mg  Q4H  PRN


 IV


 agitation  9/27/18 12:00


 10/4/18 11:59   


 


 


 Metoclopramide HCl


  (Reglan)  10 mg  Q6H  PRN


 IVP


 severe nausea  9/27/18 12:00


 10/27/18 11:59   


 


 


 Nitroglycerin


  (Ntg)  0.4 mg  Q5M X 3 DOSES PRN


 SL


 Prn Chest Pain  9/27/18 12:00


 10/27/18 11:59   


 


 


 Ondansetron HCl


  (Zofran)  4 mg  Q6H  PRN


 IVP


 Nausea & Vomiting  9/27/18 12:00


 10/27/18 11:59   


 


 


 Pantoprazole


  (Protonix)  40 mg  DAILY


 ORAL


   9/29/18 09:00


 10/29/18 08:59   


 


 


 Patient Own


 Medication


  (Patient's Own


 Med)  1 ea  DAILY


 ORAL


   9/28/18 09:00


 10/28/18 08:59  9/28/18 09:15


 


 


 Patient Own


 Medication


  (Patient's Own


 Med)  1 ea  DAILY


 ORAL


   9/28/18 09:00


 10/28/18 08:59  9/28/18 09:15


 


 


 Polyethylene


 Glycol


  (Miralax)  17 gm  HSPRN  PRN


 ORAL


 Constipation  9/27/18 12:39


 10/27/18 12:38   


 


 


 Pregabalin


  (Lyrica)  25 mg  THREE TIMES A  DAY


 ORAL


   9/28/18 18:00


 10/28/18 17:59   


 


 


 Promethazine HCl


  (Phenergan)  25 mg  Q6H  PRN


 IM


 REFRACTORY N/V  9/27/18 12:48


 10/27/18 12:47   


 


 


 Temazepam


  (Restoril)  15 mg  HSPRN  PRN


 ORAL


 Insomnia  9/27/18 12:00


 10/4/18 11:59   


 











Assessment/Plan


Status:  stable


Assessment/Plan


mdd


anxiety 





valium 10mg prm


provided francis/Ashwini Colby MD Sep 28, 2018 15:37

## 2018-09-28 NOTE — CONSULTATION
History of Present Illness


General


Date patient seen:  Sep 28, 2018


Chief Complaint:  Vomiting


Referring physician:  BELLE GILBERT


Reason for Consultation:  HIV





Present Illness


HPI


Mr. Ramos is a 72 yo male with PMHx of HIV, HTN, COPD who presented to the ED 

on 9/27/18 with vomiting and abdominal pain. The patient believes that he eat 

some bad food. His vomiting (1.5 days, NBNB) has resolved and his abdominal 

pain has improved but is still present with Lower abd cramping. He also reports 

that he had muscle cramps that have since resolved. He Denies Fevers, Chill, 

Diarrhea, Dysuria, Bleeding. In the ED he had no leukocytosis or fever. His CT 

abd/pel did not ID a source of the pain or an infectious process.





He has been on Truvada and Nevirapine for HIV Tx since Dx, He reports that he 

is compliant with his Meds. Dx 10 years ago. VL ND, does not know DC4. Never 

been on PPX Abx. 





ID was consulted for Hx of HIV








PMHX/PSHx


HTN


HIV





SocHx


No E/T/D





FamHx


Non-contributory


Allergies:  


Coded Allergies:  


     ACETAMINOPHEN (Verified  Allergy, Unknown, 3/24/10)


     ASPIRIN (Verified  Allergy, Unknown, 12/7/09)


     HYDROCODONE (Verified  Allergy, Unknown, 3/24/10)


     MORPHINE (Verified  Allergy, Unknown, 12/7/09)





Medication History


Scheduled


Amoxicillin/Potassium Clav 875-125* (Augmentin 875-125 Tablet*), 1 TAB ORAL 

TWICE A DAY


Atenolol* (Tenormin*), 50 MG ORAL DAILY, (Reported)


Atenolol* (Tenormin*), 25 MG ORAL DAILY


Clopidogrel Bisulfate* (Plavix*), 75 MG ORAL DAILY


Diazepam* (Valium*), 10 MG PO DAILY, (Reported)


Emtricitabine/Tenofovir (Truvada 200 mg-300 mg Tablet), 1 TAB ORAL DAILY, (

Reported)


Levofloxacin* (Levaquin*), 500 MG ORAL DAILY


Nevirapine (Viramune), 200 MG PO DAILY, (Reported)


Oxycodone Hcl Er* (Oxycontin*), 80 MG ORAL BID, (Reported)


Promethazine Hcl* (Phenergan*), 25 MG ORAL Q6H





Scheduled PRN


Codeine/Promethazine Hcl* (Promethazine-Codeine Syrup*), 5 ML ORAL Q4H PRN for 

For Cough





Miscellaneous Medications


Oxycodone HCl/Acetaminophen (Percocet  mg Tablet), 1 EACH PO, (Reported)


Unable to Obtain Medications (Unable To Obtain Meds), (Reported)





Patient History


Healthcare decision maker





Resuscitation status


Full Code


Advanced Directive on File


No





Review of Systems


All Other Systems:  negative except mentioned in HPI





Physical Exam





Last 24 Hour Vital Signs








  Date Time  Temp Pulse Resp B/P (MAP) Pulse Ox O2 Delivery O2 Flow Rate FiO2


 


9/28/18 09:05 98.6       


 


9/28/18 09:02  74  114/76    


 


9/28/18 08:00 98.6 74 18 114/76 (89) 98   





 98.6       


 


9/28/18 05:48    114/83    


 


9/28/18 04:00 98.5 72 18 114/83 (93) 99   





 98.5       


 


9/28/18 00:00 99.5 92 18 163/106 (125) 100   





 99.5       


 


9/27/18 23:21    163/103    


 


9/27/18 21:00      Room Air  


 


9/27/18 20:00 98.6 84 18 162/96 (118) 96   





 98.6       


 


9/27/18 17:08    165/93    


 


9/27/18 16:02 99.2       


 


9/27/18 16:00 99.2 80 20 165/93 (117) 99   





 99.2       


 


9/27/18 15:32 99.4       


 


9/27/18 14:21      Room Air  


 


9/27/18 14:19 99.4 82 19 165/100 (121) 99   





 99.4       


 


9/27/18 13:49      Room Air  


 


9/27/18 12:59 98.4 85 15 160/102 100 Room Air  





 98.4       


 


9/27/18 12:53 98.4 85 15 160/102 100 Room Air  





 98.4       


 


9/27/18 12:26    174/96    


 


9/27/18 12:13  88  192/113    


 


9/27/18 12:00 98.4 88 18 192/109 100 Room Air  





 98.4       


 


9/27/18 11:36  106  197/109    


 


9/27/18 11:13 98.4       


 


9/27/18 11:13 98.4       


 


9/27/18 11:13 98.4       


 


9/27/18 10:15 98.4       


 


9/27/18 10:05 98.4 106 18 197/109 98 Room Air  





 98.4       


 


9/27/18 09:28 98.5 115 18 199/109 98 Room Air  





 98.4       

















Intake and Output  


 


 9/27/18 9/28/18





 19:00 07:00


 


Intake Total 0 ml 375 ml


 


Balance 0 ml 375 ml


 


  


 


Intake Oral 0 ml 


 


IV Total  375 ml











Laboratory Tests








Test


  9/27/18


10:20 9/27/18


12:15 9/28/18


05:45


 


White Blood Count


  10.8 K/UL


(4.8-10.8) 


  11.9 K/UL


(4.8-10.8)  H


 


Red Blood Count


  4.99 M/UL


(4.70-6.10) 


  4.83 M/UL


(4.70-6.10)


 


Hemoglobin


  16.4 G/DL


(14.2-18.0) 


  16.3 G/DL


(14.2-18.0)


 


Hematocrit


  49.4 %


(42.0-52.0) 


  48.1 %


(42.0-52.0)


 


Mean Corpuscular Volume


  99 FL (80-99)  


  


  100 FL (80-99)


H


 


Mean Corpuscular Hemoglobin


  32.9 PG


(27.0-31.0)  H 


  33.8 PG


(27.0-31.0)  H


 


Mean Corpuscular Hemoglobin


Concent 33.2 G/DL


(32.0-36.0) 


  33.9 G/DL


(32.0-36.0)


 


Red Cell Distribution Width


  11.2 %


(11.6-14.8)  L 


  11.7 %


(11.6-14.8)


 


Platelet Count


  234 K/UL


(150-450) 


  210 K/UL


(150-450)


 


Mean Platelet Volume


  5.2 FL


(6.5-10.1)  L 


  5.2 FL


(6.5-10.1)  L


 


Neutrophils (%) (Auto)


  % (45.0-75.0)


  


  78.5 %


(45.0-75.0)  H


 


Lymphocytes (%) (Auto)


  % (20.0-45.0)


  


  14.4 %


(20.0-45.0)  L


 


Monocytes (%) (Auto)


   % (1.0-10.0)  


  


  6.9 %


(1.0-10.0)


 


Eosinophils (%) (Auto)


   % (0.0-3.0)  


  


  0.0 %


(0.0-3.0)


 


Basophils (%) (Auto)


   % (0.0-2.0)  


  


  0.2 %


(0.0-2.0)


 


Differential Total Cells


Counted 100  


  


  


 


 


Neutrophils % (Manual) 89 % (45-75)  H  


 


Lymphocytes % (Manual) 8 % (20-45)  L  


 


Monocytes % (Manual) 3 % (1-10)    


 


Eosinophils % (Manual) 0 % (0-3)    


 


Basophils % (Manual) 0 % (0-2)    


 


Band Neutrophils 0 % (0-8)    


 


Platelet Estimate Adequate    


 


Platelet Morphology Normal    


 


Red Blood Cell Morphology Normal    


 


Prothrombin Time


  10.1 SEC


(9.30-11.50) 


  


 


 


Prothromb Time International


Ratio 1.0 (0.9-1.1)  


  


  


 


 


Activated Partial


Thromboplast Time 27 SEC (23-33)


  


  30 SEC (23-33)


 


 


Sodium Level


  132 MMOL/L


(136-145)  L 


  133 MMOL/L


(136-145)  L


 


Potassium Level


  3.6 MMOL/L


(3.5-5.1) 


  4.3 MMOL/L


(3.5-5.1)


 


Chloride Level


  95 MMOL/L


()  L 


  98 MMOL/L


()


 


Carbon Dioxide Level


  28 MMOL/L


(21-32) 


  26 MMOL/L


(21-32)


 


Anion Gap


  9 mmol/L


(5-15) 


  9 mmol/L


(5-15)


 


Blood Urea Nitrogen


  13 mg/dL


(7-18) 


  20 mg/dL


(7-18)  H


 


Creatinine


  0.8 MG/DL


(0.55-1.30) 


  1.1 MG/DL


(0.55-1.30)


 


Estimat Glomerular Filtration


Rate  mL/min (>60)  


  


   mL/min (>60)  


 


 


Glucose Level


  145 MG/DL


()  H 


  99 MG/DL


()


 


Calcium Level


  9.8 MG/DL


(8.5-10.1) 


  9.1 MG/DL


(8.5-10.1)


 


Total Bilirubin


  0.6 MG/DL


(0.2-1.0) 


  0.7 MG/DL


(0.2-1.0)


 


Aspartate Amino Transf


(AST/SGOT) 23 U/L (15-37)


  


  41 U/L (15-37)


H


 


Alanine Aminotransferase


(ALT/SGPT) 27 U/L (12-78)


  


  30 U/L (12-78)


 


 


Alkaline Phosphatase


  108 U/L


() 


  89 U/L


()


 


Troponin I


  0.038 ng/mL


(0.000-0.056) 


  


 


 


Total Protein


  9.3 G/DL


(6.4-8.2)  H 


  8.1 G/DL


(6.4-8.2)


 


Albumin


  4.7 G/DL


(3.4-5.0) 


  3.8 G/DL


(3.4-5.0)


 


Globulin 4.6 g/dL    4.3 g/dL  


 


Albumin/Globulin Ratio


  1.0 (1.0-2.7)  


  


  0.9 (1.0-2.7)


L


 


Lipase


  46 U/L


()  L 


  52 U/L


()  L


 


Urine Color  Pale yellow   


 


Urine Appearance  Clear   


 


Urine pH  7 (4.5-8.0)   


 


Urine Specific Gravity


  


  1.010


(1.005-1.035) 


 


 


Urine Protein


  


  3+ (NEGATIVE)


H 


 


 


Urine Glucose (UA)


  


  2+ (NEGATIVE)


H 


 


 


Urine Ketones


  


  3+ (NEGATIVE)


H 


 


 


Urine Blood


  


  5+ (NEGATIVE)


H 


 


 


Urine Nitrite


  


  Negative


(NEGATIVE) 


 


 


Urine Bilirubin


  


  Negative


(NEGATIVE) 


 


 


Urine Urobilinogen


  


  Normal MG/DL


(0.0-1.0) 


 


 


Urine Leukocyte Esterase


  


  Negative


(NEGATIVE) 


 


 


Urine RBC


  


  10-15 /HPF (0


- 0)  H 


 


 


Urine WBC


  


  0 /HPF (0 - 0)


  


 


 


Urine Squamous Epithelial


Cells 


  None /LPF


(NONE/OCC) 


 


 


Urine Bacteria


  


  None /HPF


(NONE) 


 


 


Amylase Level


  


  


  34 U/L


()











Microbiology








 Date/Time


Source Procedure


Growth Status


 


 


 9/27/18 20:50


Rectum  Received








Height (Feet):  5


Height (Inches):  11.00


Weight (Pounds):  173


Medications





Current Medications








 Medications


  (Trade)  Dose


 Ordered  Sig/Melania


 Route


 PRN Reason  Start Time


 Stop Time Status Last Admin


Dose Admin


 


 Atenolol


  (Tenormin)  25 mg  DAILY


 ORAL


   9/28/18 09:00


 10/28/18 08:59  9/28/18 09:02


 


 


 Clonidine HCl


  (Catapres Tab)  0.1 mg  EVERY 6  HOURS


 ORAL


   9/27/18 18:00


 10/27/18 17:59  9/28/18 05:48


 


 


 Clonidine HCl


  (Catapres Tab)  0.1 mg  Q6H  PRN


 ORAL


 For High Blood Pressure  9/27/18 15:30


 10/27/18 15:29   


 


 


 Clopidogrel


 Bisulfate


  (Plavix)  75 mg  DAILY


 ORAL


   9/28/18 09:00


 10/28/18 08:59  9/28/18 09:02


 


 


 Dextrose


  (Dextrose 50%)  25 ml  Q30M  PRN


 IV


 Hypoglycemia  9/27/18 12:00


 10/27/18 11:59   


 


 


 Dextrose


  (Dextrose 50%)  50 ml  Q30M  PRN


 IV


 Hypoglycemia  9/27/18 12:15


 10/27/18 12:14   


 


 


 Dextrose/Sodium


 Chloride  1,000 ml @ 


 75 mls/hr  [id number]


 IV


   9/27/18 11:51


 10/27/18 11:50  9/28/18 01:17


 


 


 Diphenhydramine


 HCl


  (Benadryl)  25 mg  Q6H  PRN


 ORAL


 Itching/Pruritis  9/27/18 12:00


 10/27/18 11:59   


 


 


 Heparin Sodium


  (Porcine)


  (Heparin 5000


 units/ml)  5,000 units  EVERY 12  HOURS


 SUBQ


   9/27/18 21:00


 10/27/18 20:59  9/28/18 09:02


 


 


 Hydromorphone HCl


  (Dilaudid)  2 mg  Q3H  PRN


 IVP


 severe pain  9/27/18 14:15


 10/4/18 14:14  9/28/18 09:05


 


 


 Lorazepam


  (Ativan 2mg/ml


 1ml)  1 mg  Q4H  PRN


 IV


 agitation  9/27/18 12:00


 10/4/18 11:59   


 


 


 Metoclopramide HCl


  (Reglan)  10 mg  Q6H  PRN


 IVP


 severe nausea  9/27/18 12:00


 10/27/18 11:59   


 


 


 Nitroglycerin


  (Ntg)  0.4 mg  Q5M X 3 DOSES PRN


 SL


 Prn Chest Pain  9/27/18 12:00


 10/27/18 11:59   


 


 


 Ondansetron HCl


  (Zofran)  4 mg  Q6H  PRN


 IVP


 Nausea & Vomiting  9/27/18 12:00


 10/27/18 11:59   


 


 


 Pantoprazole


  (Protonix)  40 mg  DAILY


 IV


   9/28/18 09:00


 10/28/18 08:59  9/28/18 09:02


 


 


 Patient Own


 Medication


  (Patient's Own


 Med)  1 ea  DAILY


 ORAL


   9/28/18 09:00


 10/28/18 08:59  9/28/18 09:15


 


 


 Patient Own


 Medication


  (Patient's Own


 Med)  1 ea  DAILY


 ORAL


   9/28/18 09:00


 10/28/18 08:59  9/28/18 09:15


 


 


 Polyethylene


 Glycol


  (Miralax)  17 gm  HSPRN  PRN


 ORAL


 Constipation  9/27/18 12:39


 10/27/18 12:38   


 


 


 Promethazine HCl


  (Phenergan)  25 mg  Q6H  PRN


 IM


 REFRACTORY N/V  9/27/18 12:48


 10/27/18 12:47   


 


 


 Temazepam


  (Restoril)  15 mg  HSPRN  PRN


 ORAL


 Insomnia  9/27/18 12:00


 10/4/18 11:59   


 








Objective Narrative


Gen: NAD, Laying in bed, On RA


HEENT: NCAT, MMM, EOMI, PERRL, No Oral lesion, no scleral icterus


NECK: full range of motion, supple, no meningismus, No LAD, No JVD


LUNGS: CTAB, No W/C, No Accessory muscle use


CARDS: RRR, S1, S2, No M/R/G


ABD: Soft, TTP diffuse but increased lower quadrants, ND + BS


: Deferred


Ext: C/C/E, Pulses 2+ B/L (DP, Rad)


NEURO: A/O x 4, Strength and Sensation Grossly intact


PSYCH: mood/affect normal


SKIN: warm/dry, No rashes





Assessment/Plan


Assessment/Plan


72 yo male with PMHx of HIV, HTN, COPD who presented to the ED on 9/27/18 with 

vomiting and abdominal pain.





Abdominal pain and vomiting


   - Likely gastroenteritis vs food poisoning 


   - 9/27 CT abd/pel   Nonobstructive stones in the left kidney and question of 

a tiny nonobstructive stone in the right kidney. Mild diverticulosis of the 

colon Small right inguinal hernia containing fluid versus undescended 

testis.Lower lumbar interbody fusion.


 


HIV - Controlled


  - On Truvada and Nevirapine








PLAN:





- Continue home Truvada and Nevirapine





- Monitor abdominal symptoms


- Monitor Temps and CBC


- Supportive care 





~


Thank you for this consult. We will continue to follow the patient during this 

hospitalization.











Gerardo Thompson MD Sep 28, 2018 09:33

## 2018-09-28 NOTE — GENERAL PROGRESS NOTE
Assessment/Plan


Assessment/Plan


(1) Chronic subdural hematoma


(2) DDD (degenerative disc disease), lumbar


(3) DDD (degenerative disc disease), cervical


(4) Lumbar herniated disc


(5) Lumbar radiculopathy


(6) Lumbar spondylosis


(7) Cervical spondylosis


(8) Failed back surgical syndrome


Pt will be continued on Dilaudid. 


We will start Lyrica 25mg PO 1 tab Q8H 


and Flexeril 10mg PO 1 tab Q8h PRN muscle spasm. 


MRI results pending. 


Pt was d/w Dr. Dixon and he concurred.





Subjective


Date patient seen:  Sep 28, 2018


Time patient seen:  14:27


Allergies:  


Coded Allergies:  


     ACETAMINOPHEN (Verified  Allergy, Unknown, 3/24/10)


     ASPIRIN (Verified  Allergy, Unknown, 12/7/09)


     HYDROCODONE (Verified  Allergy, Unknown, 3/24/10)


     MORPHINE (Verified  Allergy, Unknown, 12/7/09)


Subjective


Constitutional:  Denies: chills, diaphoresis, fever, malaise, no symptoms, other

, weakness


HEENT:  Denies: blurred vision, double vision, ear discharge, ear pain, eye pain

, mouth pain, mouth swelling, no symptoms, nose congestion, nose pain, other, 

tearing, throat pain, throat swelling


Cardiovascular:  Denies: chest pain, edema, irregular heart rate, 

lightheadedness, no symptoms, other, palpitations, syncope


Respiratory:  Denies: SOB at rest, SOB with excertion, cough, no symptoms, 

orthopnea, other, shortness of breath, sputum, stridor, wheezing


Gastrointestinal/Abdominal:  Denies: abdomen distended, abdominal pain, black 

stools, blood in stool, constipated, diarrhea, difficulty swallowing, nausea, 

no symptoms, other, poor appetite, poor fluid intake, rectal bleeding, tarry 

stools, vomiting


Genitourinary:  Denies: burning, discharge, flank pain, frequency, hematuria, 

incontinence, no symptoms, other, pain, urgency


Neurologic/Psychiatric:  Denies: anxiety, depressed, emotional problems, 

headache, no symptoms, numbness, other, paresthesia, pre-existing deficit, 

seizure, tingling, tremors, weakness


Endocrine:  Denies: excessive sweating, flushing, increased hunger, increased 

thirst, increased urine, intolerance to cold, intolerance to heat, no symptoms, 

other, unexplained weight gain, unexplained weight loss


Hematologic/Lymphatic:  Denies: anemia, easy bleeding, easy bruising, no 

symptoms, other





Subjective


Pt has been c/o severe pain which he feels is a pulling pain radiating into his 

right hip and groin, was sent to MRI of L spine. Was also increased to Dilaudid 

3mg IV Q3H PRN as per internist. As Out pt pt is on Oxycontin 80mg TID and 

Percocet 10/325mg Q8H PRN prescribed by ALEKSANDER Hidalgo.





Objective





Last 24 Hour Vital Signs








  Date Time  Temp Pulse Resp B/P (MAP) Pulse Ox O2 Delivery O2 Flow Rate FiO2


 


9/28/18 13:59 97.8       


 


9/28/18 13:29 97.8       


 


9/28/18 12:45 97.8       


 


9/28/18 12:15 97.8       


 


9/28/18 12:15    119/69    


 


9/28/18 12:00 97.8 55 20 119/69 (86) 98   





 97.8       


 


9/28/18 09:05 98.6       


 


9/28/18 09:02  74  114/76    


 


9/28/18 09:00      Room Air  


 


9/28/18 08:00 98.6 74 18 114/76 (89) 98   





 98.6       


 


9/28/18 05:48    114/83    


 


9/28/18 04:00 98.5 72 18 114/83 (93) 99   





 98.5       


 


9/28/18 00:00 99.5 92 18 163/106 (125) 100   





 99.5       


 


9/27/18 23:21    163/103    


 


9/27/18 21:00      Room Air  


 


9/27/18 20:00 98.6 84 18 162/96 (118) 96   





 98.6       


 


9/27/18 17:08    165/93    


 


9/27/18 16:00 99.2 80 20 165/93 (117) 99   





 99.2       


 


9/27/18 15:32 99.4       

















Intake and Output  


 


 9/27/18 9/28/18





 19:00 07:00


 


Intake Total 0 ml 375 ml


 


Balance 0 ml 375 ml


 


  


 


Intake Oral 0 ml 


 


IV Total  375 ml








Laboratory Tests


9/28/18 05:45: 


White Blood Count 11.9H, Red Blood Count 4.83, Hemoglobin 16.3, Hematocrit 48.1

, Mean Corpuscular Volume 100H, Mean Corpuscular Hemoglobin 33.8H, Mean 

Corpuscular Hemoglobin Concent 33.9, Red Cell Distribution Width 11.7, Platelet 

Count 210, Mean Platelet Volume 5.2L, Neutrophils (%) (Auto) 78.5H, Lymphocytes 

(%) (Auto) 14.4L, Monocytes (%) (Auto) 6.9, Eosinophils (%) (Auto) 0.0, 

Basophils (%) (Auto) 0.2, Activated Partial Thromboplast Time 30, Sodium Level 

133L, Potassium Level 4.3, Chloride Level 98, Carbon Dioxide Level 26, Anion 

Gap 9, Blood Urea Nitrogen 20H, Creatinine 1.1, Estimat Glomerular Filtration 

Rate , Glucose Level 99, Calcium Level 9.1, Total Bilirubin 0.7, Aspartate 

Amino Transf (AST/SGOT) 41H, Alanine Aminotransferase (ALT/SGPT) 30, Alkaline 

Phosphatase 89, Total Protein 8.1, Albumin 3.8, Globulin 4.3, Albumin/Globulin 

Ratio 0.9L, Amylase Level 34, Lipase 52L


Height (Feet):  5


Height (Inches):  11.00


Weight (Pounds):  173


Objective


General Appearance:  no apparent distress, alert


EENT:  PERRL/EOMI


Neck:  normal alignment, supple


Cardiovascular:  normal rate, regular rhythm


Respiratory/Chest:  lungs clear, normal breath sounds


Abdomen:  non tender, soft


Extremities:  non-tender


Neurologic:  alert, oriented x 3


Skin:  warm/dry











Jean Beltran Sep 28, 2018 14:30

## 2018-09-28 NOTE — GENERAL PROGRESS NOTE
Assessment/Plan


Status:  stable, progressing


Assessment/Plan


mdd


anxiety 





valium 10mg prm


provided ro/st





Subjective


Date patient seen:  Sep 28, 2018


Neurologic/Psychiatric:  Reports: anxiety, depressed, emotional problems


Allergies:  


Coded Allergies:  


     ACETAMINOPHEN (Verified  Allergy, Unknown, 3/24/10)


     ASPIRIN (Verified  Allergy, Unknown, 12/7/09)


     HYDROCODONE (Verified  Allergy, Unknown, 3/24/10)


     MORPHINE (Verified  Allergy, Unknown, 12/7/09)





Objective





Last 24 Hour Vital Signs








  Date Time  Temp Pulse Resp B/P (MAP) Pulse Ox O2 Delivery O2 Flow Rate FiO2


 


9/28/18 13:59 97.8       


 


9/28/18 13:29 97.8       


 


9/28/18 12:45 97.8       


 


9/28/18 12:15 97.8       


 


9/28/18 12:15    119/69    


 


9/28/18 12:00 97.8 55 20 119/69 (86) 98   





 97.8       


 


9/28/18 09:05 98.6       


 


9/28/18 09:02  74  114/76    


 


9/28/18 09:00      Room Air  


 


9/28/18 08:00 98.6 74 18 114/76 (89) 98   





 98.6       


 


9/28/18 05:48    114/83    


 


9/28/18 04:00 98.5 72 18 114/83 (93) 99   





 98.5       


 


9/28/18 00:00 99.5 92 18 163/106 (125) 100   





 99.5       


 


9/27/18 23:21    163/103    


 


9/27/18 21:00      Room Air  


 


9/27/18 20:00 98.6 84 18 162/96 (118) 96   





 98.6       


 


9/27/18 17:08    165/93    


 


9/27/18 16:00 99.2 80 20 165/93 (117) 99   





 99.2       

















Intake and Output  


 


 9/27/18 9/28/18





 19:00 07:00


 


Intake Total 0 ml 375 ml


 


Balance 0 ml 375 ml


 


  


 


Intake Oral 0 ml 


 


IV Total  375 ml








Laboratory Tests


9/28/18 05:45: 


White Blood Count 11.9H, Red Blood Count 4.83, Hemoglobin 16.3, Hematocrit 48.1

, Mean Corpuscular Volume 100H, Mean Corpuscular Hemoglobin 33.8H, Mean 

Corpuscular Hemoglobin Concent 33.9, Red Cell Distribution Width 11.7, Platelet 

Count 210, Mean Platelet Volume 5.2L, Neutrophils (%) (Auto) 78.5H, Lymphocytes 

(%) (Auto) 14.4L, Monocytes (%) (Auto) 6.9, Eosinophils (%) (Auto) 0.0, 

Basophils (%) (Auto) 0.2, Activated Partial Thromboplast Time 30, Sodium Level 

133L, Potassium Level 4.3, Chloride Level 98, Carbon Dioxide Level 26, Anion 

Gap 9, Blood Urea Nitrogen 20H, Creatinine 1.1, Estimat Glomerular Filtration 

Rate , Glucose Level 99, Calcium Level 9.1, Total Bilirubin 0.7, Aspartate 

Amino Transf (AST/SGOT) 41H, Alanine Aminotransferase (ALT/SGPT) 30, Alkaline 

Phosphatase 89, Total Protein 8.1, Albumin 3.8, Globulin 4.3, Albumin/Globulin 

Ratio 0.9L, Amylase Level 34, Lipase 52L


Height (Feet):  5


Height (Inches):  11.00


Weight (Pounds):  173


General Appearance:  no apparent distress, alert


Neurologic:  oriented x 3, responsive, depressed affect











Ashwini Wynn MD Sep 28, 2018 15:38

## 2018-09-28 NOTE — GI PROGRESS NOTE
Assessment/Plan


Problems:  


(1) Abdominal pain of unknown etiology


ICD Codes:  R10.9 - Unspecified abdominal pain


SNOMED:  833670469


(2) Injury, spinal cord


SNOMED:  06253682


(3) Intractable vomiting


ICD Codes:  R11.10 - Vomiting, unspecified


SNOMED:  345493780


(4) Intractable back pain


ICD Codes:  M54.9 - Intractable back pain


SNOMED:  [id number]


Status:  stable


Status Narrative


Discussed with Dr. Ramirez.


Assessment/Plan


hx of spinal surgery


symptomatic treatment at this time


GI cocktail prn


zofran prn


regular diet


ppi


pain mgmt


bowel regime


patient is on plavix


fu labs





The patient was seen and examined at bedside and all new and available data was 

reviewed in the patients chart. I agree with the above findings, impression 

and plan.  (Patient seen earlier today. Signature stamp does not reflect 

patient encounter time.). - Mu Ramirez MD





Subjective


Subjective


abdominal pain has improved


has complaint of back pain with radiation


hx of spinal surgery





Objective





Last 24 Hour Vital Signs








  Date Time  Temp Pulse Resp B/P (MAP) Pulse Ox O2 Delivery O2 Flow Rate FiO2


 


9/28/18 12:15 97.8       


 


9/28/18 12:15    119/69    


 


9/28/18 12:00 97.8 55 20 119/69 (86) 98   





 97.8       


 


9/28/18 09:35 98.6       


 


9/28/18 09:05 98.6       


 


9/28/18 09:02  74  114/76    


 


9/28/18 09:00      Room Air  


 


9/28/18 08:00 98.6 74 18 114/76 (89) 98   





 98.6       


 


9/28/18 05:48    114/83    


 


9/28/18 04:00 98.5 72 18 114/83 (93) 99   





 98.5       


 


9/28/18 00:00 99.5 92 18 163/106 (125) 100   





 99.5       


 


9/27/18 23:21    163/103    


 


9/27/18 21:00      Room Air  


 


9/27/18 20:00 98.6 84 18 162/96 (118) 96   





 98.6       


 


9/27/18 17:08    165/93    


 


9/27/18 16:00 99.2 80 20 165/93 (117) 99   





 99.2       


 


9/27/18 15:32 99.4       


 


9/27/18 14:21      Room Air  


 


9/27/18 14:19 99.4 82 19 165/100 (121) 99   





 99.4       


 


9/27/18 13:49      Room Air  


 


9/27/18 12:59 98.4 85 15 160/102 100 Room Air  





 98.4       


 


9/27/18 12:53 98.4 85 15 160/102 100 Room Air  





 98.4       

















Intake and Output  


 


 9/27/18 9/28/18





 19:00 07:00


 


Intake Total 0 ml 375 ml


 


Balance 0 ml 375 ml


 


  


 


Intake Oral 0 ml 


 


IV Total  375 ml











Laboratory Tests








Test


  9/28/18


05:45


 


White Blood Count


  11.9 K/UL


(4.8-10.8)  H


 


Red Blood Count


  4.83 M/UL


(4.70-6.10)


 


Hemoglobin


  16.3 G/DL


(14.2-18.0)


 


Hematocrit


  48.1 %


(42.0-52.0)


 


Mean Corpuscular Volume


  100 FL (80-99)


H


 


Mean Corpuscular Hemoglobin


  33.8 PG


(27.0-31.0)  H


 


Mean Corpuscular Hemoglobin


Concent 33.9 G/DL


(32.0-36.0)


 


Red Cell Distribution Width


  11.7 %


(11.6-14.8)


 


Platelet Count


  210 K/UL


(150-450)


 


Mean Platelet Volume


  5.2 FL


(6.5-10.1)  L


 


Neutrophils (%) (Auto)


  78.5 %


(45.0-75.0)  H


 


Lymphocytes (%) (Auto)


  14.4 %


(20.0-45.0)  L


 


Monocytes (%) (Auto)


  6.9 %


(1.0-10.0)


 


Eosinophils (%) (Auto)


  0.0 %


(0.0-3.0)


 


Basophils (%) (Auto)


  0.2 %


(0.0-2.0)


 


Activated Partial


Thromboplast Time 30 SEC (23-33)


 


 


Sodium Level


  133 MMOL/L


(136-145)  L


 


Potassium Level


  4.3 MMOL/L


(3.5-5.1)


 


Chloride Level


  98 MMOL/L


()


 


Carbon Dioxide Level


  26 MMOL/L


(21-32)


 


Anion Gap


  9 mmol/L


(5-15)


 


Blood Urea Nitrogen


  20 mg/dL


(7-18)  H


 


Creatinine


  1.1 MG/DL


(0.55-1.30)


 


Estimat Glomerular Filtration


Rate  mL/min (>60)  


 


 


Glucose Level


  99 MG/DL


()


 


Calcium Level


  9.1 MG/DL


(8.5-10.1)


 


Total Bilirubin


  0.7 MG/DL


(0.2-1.0)


 


Aspartate Amino Transf


(AST/SGOT) 41 U/L (15-37)


H


 


Alanine Aminotransferase


(ALT/SGPT) 30 U/L (12-78)


 


 


Alkaline Phosphatase


  89 U/L


()


 


Total Protein


  8.1 G/DL


(6.4-8.2)


 


Albumin


  3.8 G/DL


(3.4-5.0)


 


Globulin 4.3 g/dL  


 


Albumin/Globulin Ratio


  0.9 (1.0-2.7)


L


 


Amylase Level


  34 U/L


()


 


Lipase


  52 U/L


()  L











Microbiology








 Date/Time


Source Procedure


Growth Status


 


 


 9/27/18 20:50


Rectum  Received








Height (Feet):  5


Height (Inches):  11.00


Weight (Pounds):  173


General Appearance:  WD/WN, no apparent distress, alert


Cardiovascular:  normal rate


Respiratory/Chest:  normal breath sounds, no respiratory distress


Abdominal Exam:  normal bowel sounds, non tender, soft


Extremities:  normal range of motion, non-tender











Jorge Jeff NP Sep 28, 2018 12:49

## 2018-09-29 VITALS — DIASTOLIC BLOOD PRESSURE: 63 MMHG | SYSTOLIC BLOOD PRESSURE: 103 MMHG

## 2018-09-29 VITALS — DIASTOLIC BLOOD PRESSURE: 68 MMHG | SYSTOLIC BLOOD PRESSURE: 115 MMHG

## 2018-09-29 VITALS — SYSTOLIC BLOOD PRESSURE: 130 MMHG | DIASTOLIC BLOOD PRESSURE: 79 MMHG

## 2018-09-29 VITALS — DIASTOLIC BLOOD PRESSURE: 66 MMHG | SYSTOLIC BLOOD PRESSURE: 108 MMHG

## 2018-09-29 VITALS — DIASTOLIC BLOOD PRESSURE: 66 MMHG | SYSTOLIC BLOOD PRESSURE: 104 MMHG

## 2018-09-29 VITALS — DIASTOLIC BLOOD PRESSURE: 78 MMHG | SYSTOLIC BLOOD PRESSURE: 118 MMHG

## 2018-09-29 LAB
ADD MANUAL DIFF: NO
ANION GAP SERPL CALC-SCNC: 6 MMOL/L (ref 5–15)
BASOPHILS NFR BLD AUTO: 1.1 % (ref 0–2)
BUN SERPL-MCNC: 42 MG/DL (ref 7–18)
CALCIUM SERPL-MCNC: 9.1 MG/DL (ref 8.5–10.1)
CHLORIDE SERPL-SCNC: 97 MMOL/L (ref 98–107)
CO2 SERPL-SCNC: 29 MMOL/L (ref 21–32)
CREAT SERPL-MCNC: 1.7 MG/DL (ref 0.55–1.3)
EOSINOPHIL NFR BLD AUTO: 1.6 % (ref 0–3)
ERYTHROCYTE [DISTWIDTH] IN BLOOD BY AUTOMATED COUNT: 11.5 % (ref 11.6–14.8)
HCT VFR BLD CALC: 45.5 % (ref 42–52)
HGB BLD-MCNC: 15.3 G/DL (ref 14.2–18)
LYMPHOCYTES NFR BLD AUTO: 33.3 % (ref 20–45)
MCV RBC AUTO: 101 FL (ref 80–99)
MONOCYTES NFR BLD AUTO: 7.3 % (ref 1–10)
NEUTROPHILS NFR BLD AUTO: 56.8 % (ref 45–75)
PLATELET # BLD: 210 K/UL (ref 150–450)
POTASSIUM SERPL-SCNC: 3.9 MMOL/L (ref 3.5–5.1)
RBC # BLD AUTO: 4.51 M/UL (ref 4.7–6.1)
SODIUM SERPL-SCNC: 132 MMOL/L (ref 136–145)
WBC # BLD AUTO: 8.2 K/UL (ref 4.8–10.8)

## 2018-09-29 RX ADMIN — DEXTROSE AND SODIUM CHLORIDE SCH MLS/HR: 5; .45 INJECTION, SOLUTION INTRAVENOUS at 04:15

## 2018-09-29 RX ADMIN — HEPARIN SODIUM SCH UNITS: 5000 INJECTION INTRAVENOUS; SUBCUTANEOUS at 09:29

## 2018-09-29 RX ADMIN — DEXTROSE AND SODIUM CHLORIDE SCH MLS/HR: 5; .45 INJECTION, SOLUTION INTRAVENOUS at 22:11

## 2018-09-29 RX ADMIN — HEPARIN SODIUM SCH UNITS: 5000 INJECTION INTRAVENOUS; SUBCUTANEOUS at 20:29

## 2018-09-29 RX ADMIN — PREGABALIN SCH MG: 25 CAPSULE ORAL at 13:42

## 2018-09-29 RX ADMIN — ATENOLOL SCH MG: 25 TABLET ORAL at 09:00

## 2018-09-29 RX ADMIN — PREGABALIN SCH MG: 25 CAPSULE ORAL at 09:25

## 2018-09-29 RX ADMIN — VALSARTAN SCH EA: 160 TABLET ORAL at 09:29

## 2018-09-29 RX ADMIN — PREGABALIN SCH MG: 25 CAPSULE ORAL at 17:14

## 2018-09-29 RX ADMIN — BISACODYL SCH MG: 5 TABLET, COATED ORAL at 20:29

## 2018-09-29 RX ADMIN — DEXTROSE AND SODIUM CHLORIDE SCH MLS/HR: 5; .45 INJECTION, SOLUTION INTRAVENOUS at 12:00

## 2018-09-29 RX ADMIN — DOCUSATE SODIUM SCH MG: 100 CAPSULE, LIQUID FILLED ORAL at 17:14

## 2018-09-29 RX ADMIN — DOCUSATE SODIUM SCH MG: 100 CAPSULE, LIQUID FILLED ORAL at 09:26

## 2018-09-29 NOTE — DIAGNOSTIC IMAGING REPORT
EXAM:

  US Retroperitoneal Limited, Renal

 

CLINICAL HISTORY:

  RENAL-A

 

TECHNIQUE:

  Real-time ultrasound of the retroperitoneum (limited) with image 

documentation.

 

COMPARISON:

  No relevant prior studies available.

 

FINDINGS:

  Right kidney:  Right kidney measures 10.8 x 5.1 x 4.5 cm.  No visible 

renal parenchymal lesions.  No visible stones.  No hydronephrosis.

  Left kidney:  Left kidney measures 11.0 x 5.7 x 5.2 cm. No visible 

renal parenchymal lesions.  No visible stones.  No hydronephrosis.

  Bladder:  The urinary bladder appears unremarkable.

 

IMPRESSION:     

  No acute findings.

## 2018-09-29 NOTE — PULMONOLOGY PROGRESS NOTE
Assessment/Plan


Assessment/Plan


ASSESSMENT 


acute renal failure/ATN 


abdominal pain with intractable vomiting 


constipation 


diverticulosis 


hypertension  


COPD


HIV status  


HTN


chronic subdural hematoma 


intractable back pain 


DDD L-spine 


DDD C-spine


lumbar herniated disc 


lumbar radiculopathy 


lumbar spondylosis 


cervical spondylosis 


failed back surgery syndrome   





PLAN OF CARE 


MS floor  


increase IVF  rate 


stat renal US r/o obstruction


nephro eval


CT A/P diverticulosis w/out diverticulitis 


abdominal US   + cholelithiasis, no dilated ducts, nonobstructive bilateral 

nephrolithiases   


GI follows


GI cocktail prn


a/emetic prn 


regular diet, monitor tolerance 


GI prophylaxis with PPI 


meticulous bowel regimen ( last BM Thursday 9/27/) 


last BM 9/27





MRI L spine with degenerative changes at L2-3 and L3-4, resulting in mild 

spinal canal and neural foraminal compromise, unchanged from earlier study of 12

/15/2016 


pain specialist follows


pain management as per pain specialist recs with multi approach 


PT/OT 


fall precautions   





O2 HHN prn 


BP management with BB, 


continue a/PLT therapy with Plavix


DVT prophylaxis   





case discussed and evaluated by supervising physician





Subjective


Allergies:  


Coded Allergies:  


     ACETAMINOPHEN (Verified  Allergy, Unknown, 3/24/10)


     ASPIRIN (Verified  Allergy, Unknown, 12/7/09)


     HYDROCODONE (Verified  Allergy, Unknown, 3/24/10)


     MORPHINE (Verified  Allergy, Unknown, 12/7/09)


Subjective


pain better controlled, but still significant no CP no SOB 


pulse oz stable on RA   


sharp increase in creat from 1.1 to 1.7 in 24 hrs





Objective





Last 24 Hour Vital Signs








  Date Time  Temp Pulse Resp B/P (MAP) Pulse Ox O2 Delivery O2 Flow Rate FiO2


 


9/29/18 06:00    104/66    


 


9/29/18 04:00 97.3 60 18 104/66 (79) 97   





 97.3       


 


9/29/18 00:00 98.1 72 18 118/78 (91) 99   





 98.1       


 


9/28/18 23:55    118/78    


 


9/28/18 21:00      Room Air  


 


9/28/18 20:00 97.5 62 18 102/66 (78) 98   





 97.5       


 


9/28/18 17:40 97.8       


 


9/28/18 17:40 97.8       


 


9/28/18 17:16 97.8       


 


9/28/18 17:16    116/74    


 


9/28/18 17:10 97.8       


 


9/28/18 16:00 97.0 59 18 116/74 (88) 99   





 97.0       


 


9/28/18 13:59 97.8       


 


9/28/18 13:29 97.8       


 


9/28/18 12:45 97.8       


 


9/28/18 12:15 97.8       


 


9/28/18 12:15    119/69    


 


9/28/18 12:00 97.8 55 20 119/69 (86) 98   





 97.8       


 


9/28/18 09:05 98.6       


 


9/28/18 09:02  74  114/76    


 


9/28/18 09:00      Room Air  

















Intake and Output  


 


 9/28/18 9/29/18





 19:00 07:00


 


Intake Total 975 ml 825 ml


 


Balance 975 ml 825 ml


 


  


 


IV Total 975 ml 825 ml








General Appearance:  WD/WN, no acute distress


HEENT:  normocephalic, atraumatic, anicteric, mucous membranes moist


Respiratory/Chest:  lungs clear - with moderate air exchange , no respiratory 

distress, no accessory muscle use


Cardiovascular:  normal rate


Abdomen:  normal bowel sounds, soft, non tender, non distended


Extremities:  no edema


Neurologic/Psychiatric:  abnormal gait, alert, oriented x 3, responsive


Musculoskeletal:  normal muscle bulk





Microbiology








 Date/Time


Source Procedure


Growth Status


 


 


 9/27/18 20:50


Rectum VRE Culture - Final


NO VANCOMYCIN RESISTANT ENTEROCOCCUS ... Complete


 


 9/27/18 20:50


Rectum  Received








Laboratory Tests


9/29/18 07:25: 


White Blood Count 8.2, Red Blood Count 4.51L, Hemoglobin 15.3, Hematocrit 45.5, 

Mean Corpuscular Volume 101H, Mean Corpuscular Hemoglobin 34.0H, Mean 

Corpuscular Hemoglobin Concent 33.7, Red Cell Distribution Width 11.5L, 

Platelet Count 210, Mean Platelet Volume 5.2L, Neutrophils (%) (Auto) 56.8, 

Lymphocytes (%) (Auto) 33.3, Monocytes (%) (Auto) 7.3, Eosinophils (%) (Auto) 

1.6, Basophils (%) (Auto) 1.1, Sodium Level 132L, Potassium Level 3.9, Chloride 

Level 97L, Carbon Dioxide Level 29, Anion Gap 6, Blood Urea Nitrogen 42H, 

Creatinine 1.7#H, Estimat Glomerular Filtration Rate , Glucose Level 78, 

Calcium Level 9.1





Current Medications








 Medications


  (Trade)  Dose


 Ordered  Sig/Melania


 Route


 PRN Reason  Start Time


 Stop Time Status Last Admin


Dose Admin


 


 Atenolol


  (Tenormin)  25 mg  DAILY


 ORAL


   9/28/18 09:00


 10/28/18 08:59  9/28/18 09:02


 


 


 Bisacodyl


  (Dulcolax)  5 mg  DAILY@2100


 ORAL


   9/29/18 21:00


 10/29/18 20:59   


 


 


 Clonidine HCl


  (Catapres Tab)  0.1 mg  EVERY 6  HOURS


 ORAL


   9/27/18 18:00


 10/27/18 17:59  9/28/18 23:55


 


 


 Clonidine HCl


  (Catapres Tab)  0.1 mg  Q6H  PRN


 ORAL


 For High Blood Pressure  9/27/18 15:30


 10/27/18 15:29   


 


 


 Clopidogrel


 Bisulfate


  (Plavix)  75 mg  DAILY


 ORAL


   9/28/18 09:00


 10/28/18 08:59  9/28/18 09:02


 


 


 Cyclobenzaprine


 HCl


  (Flexeril)  10 mg  TIDPRN  PRN


 ORAL


 Muscle Spasm  9/28/18 15:00


 10/28/18 14:59   


 


 


 Dextrose


  (Dextrose 50%)  25 ml  Q30M  PRN


 IV


 Hypoglycemia  9/27/18 12:00


 10/27/18 11:59   


 


 


 Dextrose


  (Dextrose 50%)  50 ml  Q30M  PRN


 IV


 Hypoglycemia  9/27/18 12:15


 10/27/18 12:14   


 


 


 Dextrose/Sodium


 Chloride  1,000 ml @ 


 75 mls/hr  [id number]


 IV


   9/27/18 11:51


 10/27/18 11:50  9/29/18 04:15


 


 


 Diazepam


  (Valium)  10 mg  HSPRN  PRN


 ORAL


 Insomnia  9/28/18 15:45


 10/5/18 15:44  9/29/18 00:03


 


 


 Diphenhydramine


 HCl


  (Benadryl)  25 mg  Q6H  PRN


 ORAL


 Itching/Pruritis  9/27/18 12:00


 10/27/18 11:59   


 


 


 Docusate Sodium


  (Colace)  100 mg  TWICE A  DAY


 ORAL


   9/29/18 09:00


 10/29/18 08:59   


 


 


 Heparin Sodium


  (Porcine)


  (Heparin 5000


 units/ml)  5,000 units  EVERY 12  HOURS


 SUBQ


   9/27/18 21:00


 10/27/18 20:59  9/28/18 20:21


 


 


 Hydromorphone HCl


  (Dilaudid)  3 mg  Q3H  PRN


 IVP


 severe pain  9/28/18 14:15


 10/4/18 14:14  9/29/18 07:44


 


 


 Lorazepam


  (Ativan 2mg/ml


 1ml)  1 mg  Q4H  PRN


 IV


 agitation  9/27/18 12:00


 10/4/18 11:59   


 


 


 Metoclopramide HCl


  (Reglan)  10 mg  Q6H  PRN


 IVP


 severe nausea  9/27/18 12:00


 10/27/18 11:59   


 


 


 Nitroglycerin


  (Ntg)  0.4 mg  Q5M X 3 DOSES PRN


 SL


 Prn Chest Pain  9/27/18 12:00


 10/27/18 11:59   


 


 


 Ondansetron HCl


  (Zofran)  4 mg  Q6H  PRN


 IVP


 Nausea & Vomiting  9/27/18 12:00


 10/27/18 11:59   


 


 


 Pantoprazole


  (Protonix)  40 mg  DAILY


 ORAL


   9/29/18 09:00


 10/29/18 08:59   


 


 


 Patient Own


 Medication


  (Patient's Own


 Med)  1 ea  DAILY


 ORAL


   9/28/18 09:00


 10/28/18 08:59  9/28/18 09:15


 


 


 Patient Own


 Medication


  (Patient's Own


 Med)  1 ea  DAILY


 ORAL


   9/28/18 09:00


 10/28/18 08:59  9/28/18 09:15


 


 


 Polyethylene


 Glycol


  (Miralax)  17 gm  HSPRN  PRN


 ORAL


 Constipation  9/27/18 12:39


 10/27/18 12:38   


 


 


 Pregabalin


  (Lyrica)  25 mg  THREE TIMES A  DAY


 ORAL


   9/28/18 18:00


 10/28/18 17:59  9/28/18 17:16


 


 


 Promethazine HCl


  (Phenergan)  25 mg  Q6H  PRN


 IM


 REFRACTORY N/V  9/27/18 12:48


 10/27/18 12:47   


 

















Natalie Govea NP Sep 29, 2018 08:30

## 2018-09-29 NOTE — INFECTIOUS DISEASES PROG NOTE
Assessment/Plan


Assessment/Plan


74 yo male with





Abdominal pain and vomiting


   - Likely gastroenteritis vs food poisoning 


   - US of abd : no sig findings


   - 9/27 CT abd/pel   Nonobstructive stones in the left kidney and question of 

a tiny nonobstructive stone in the right kidney. Mild diverticulosis of the 

colon Small right inguinal hernia containing fluid versus undescended 

testis.Lower lumbar interbody fusion.


 HIV - Controlled


  - On Truvada and Nevirapine














LBP: 


 MRI: Degenerative changes at L2-3 and L3-4, resulting in mild spinal canal and 

neural foraminal compromise, unchanged from earlier study of 12/15/2016


        Postsurgical changes at L4-5 and L5-S1, as described. Note that 

susceptibility artifact from such could obscure pathology of this level. No 

evidence of neural compromise except for minimal neural foraminal stenosis at L5

-S1 bilaterally


HTN


COPD








PLAN:





- Continue home Truvada and Nevirapine


- Monitor abdominal symptoms


- Monitor Temps and CBC


- Supportive care





Subjective


Allergies:  


Coded Allergies:  


     ACETAMINOPHEN (Verified  Allergy, Unknown, 3/24/10)


     ASPIRIN (Verified  Allergy, Unknown, 12/7/09)


     HYDROCODONE (Verified  Allergy, Unknown, 3/24/10)


     MORPHINE (Verified  Allergy, Unknown, 12/7/09)


Subjective


comfortable





Objective


Vital Signs





Last 24 Hour Vital Signs








  Date Time  Temp Pulse Resp B/P (MAP) Pulse Ox O2 Delivery O2 Flow Rate FiO2


 


9/29/18 11:22 98.1       


 


9/29/18 10:52 98.1       


 


9/29/18 09:55 98.1       


 


9/29/18 09:25 98.1       


 


9/29/18 09:00  67  103/63    


 


9/29/18 09:00      Room Air  


 


9/29/18 08:00 98.1 67 18 103/63 (76) 98   





 98.1       


 


9/29/18 06:00    104/66    


 


9/29/18 04:00 97.3 60 18 104/66 (79) 97   





 97.3       


 


9/29/18 00:00 98.1 72 18 118/78 (91) 99   





 98.1       


 


9/28/18 23:55    118/78    


 


9/28/18 21:00      Room Air  


 


9/28/18 20:00 97.5 62 18 102/66 (78) 98   





 97.5       


 


9/28/18 17:16 97.8       


 


9/28/18 17:16    116/74    


 


9/28/18 17:10 97.8       


 


9/28/18 16:00 97.0 59 18 116/74 (88) 99   





 97.0       


 


9/28/18 13:59 97.8       


 


9/28/18 13:29 97.8       


 


9/28/18 12:45 97.8       


 


9/28/18 12:15 97.8       


 


9/28/18 12:15    119/69    


 


9/28/18 12:00 97.8 55 20 119/69 (86) 98   





 97.8       








Height (Feet):  5


Height (Inches):  11.00


Weight (Pounds):  173


HEENT:  anicteric


Respiratory/Chest:  no respiratory distress


Cardiovascular:  regularly irregular


Abdomen:  no organomegaly





Microbiology








 Date/Time


Source Procedure


Growth Status


 


 


 9/27/18 20:50


Nasal Nares MRSA Culture - Final


NO METHICILLIN RESISTANT STAPH AUREUS... Complete


 


 9/27/18 20:50


Rectum VRE Culture - Final


NO VANCOMYCIN RESISTANT ENTEROCOCCUS ... Complete


 


 9/27/18 20:50


Rectum  Received











Laboratory Tests








Test


  9/29/18


07:25


 


White Blood Count


  8.2 K/UL


(4.8-10.8)


 


Red Blood Count


  4.51 M/UL


(4.70-6.10)  L


 


Hemoglobin


  15.3 G/DL


(14.2-18.0)


 


Hematocrit


  45.5 %


(42.0-52.0)


 


Mean Corpuscular Volume


  101 FL (80-99)


H


 


Mean Corpuscular Hemoglobin


  34.0 PG


(27.0-31.0)  H


 


Mean Corpuscular Hemoglobin


Concent 33.7 G/DL


(32.0-36.0)


 


Red Cell Distribution Width


  11.5 %


(11.6-14.8)  L


 


Platelet Count


  210 K/UL


(150-450)


 


Mean Platelet Volume


  5.2 FL


(6.5-10.1)  L


 


Neutrophils (%) (Auto)


  56.8 %


(45.0-75.0)


 


Lymphocytes (%) (Auto)


  33.3 %


(20.0-45.0)


 


Monocytes (%) (Auto)


  7.3 %


(1.0-10.0)


 


Eosinophils (%) (Auto)


  1.6 %


(0.0-3.0)


 


Basophils (%) (Auto)


  1.1 %


(0.0-2.0)


 


Sodium Level


  132 MMOL/L


(136-145)  L


 


Potassium Level


  3.9 MMOL/L


(3.5-5.1)


 


Chloride Level


  97 MMOL/L


()  L


 


Carbon Dioxide Level


  29 MMOL/L


(21-32)


 


Anion Gap


  6 mmol/L


(5-15)


 


Blood Urea Nitrogen


  42 mg/dL


(7-18)  H


 


Creatinine


  1.7 MG/DL


(0.55-1.30)  #H


 


Estimat Glomerular Filtration


Rate  mL/min (>60)  


 


 


Glucose Level


  78 MG/DL


()


 


Calcium Level


  9.1 MG/DL


(8.5-10.1)











Current Medications








 Medications


  (Trade)  Dose


 Ordered  Sig/Melania


 Route


 PRN Reason  Start Time


 Stop Time Status Last Admin


Dose Admin


 


 Atenolol


  (Tenormin)  25 mg  DAILY


 ORAL


   9/28/18 09:00


 10/28/18 08:59  9/28/18 09:02


 


 


 Bisacodyl


  (Dulcolax)  5 mg  DAILY@2100


 ORAL


   9/29/18 21:00


 10/29/18 20:59   


 


 


 Clonidine HCl


  (Catapres Tab)  0.1 mg  EVERY 6  HOURS


 ORAL


   9/27/18 18:00


 10/27/18 17:59  9/28/18 23:55


 


 


 Clonidine HCl


  (Catapres Tab)  0.1 mg  Q6H  PRN


 ORAL


 For High Blood Pressure  9/27/18 15:30


 10/27/18 15:29   


 


 


 Clopidogrel


 Bisulfate


  (Plavix)  75 mg  DAILY


 ORAL


   9/28/18 09:00


 10/28/18 08:59  9/29/18 09:25


 


 


 Cyclobenzaprine


 HCl


  (Flexeril)  10 mg  TIDPRN  PRN


 ORAL


 Muscle Spasm  9/28/18 15:00


 10/28/18 14:59   


 


 


 Dextrose


  (Dextrose 50%)  25 ml  Q30M  PRN


 IV


 Hypoglycemia  9/27/18 12:00


 10/27/18 11:59   


 


 


 Dextrose


  (Dextrose 50%)  50 ml  Q30M  PRN


 IV


 Hypoglycemia  9/27/18 12:15


 10/27/18 12:14   


 


 


 Dextrose/Sodium


 Chloride  1,000 ml @ 


 100 mls/hr  Q10H


 IV


   9/29/18 11:51


 10/27/18 11:50 UNV  


 


 


 Diazepam


  (Valium)  10 mg  HSPRN  PRN


 ORAL


 Insomnia  9/28/18 15:45


 10/5/18 15:44  9/29/18 00:03


 


 


 Diphenhydramine


 HCl


  (Benadryl)  25 mg  Q6H  PRN


 ORAL


 Itching/Pruritis  9/27/18 12:00


 10/27/18 11:59   


 


 


 Docusate Sodium


  (Colace)  100 mg  TWICE A  DAY


 ORAL


   9/29/18 09:00


 10/29/18 08:59  9/29/18 09:26


 


 


 Heparin Sodium


  (Porcine)


  (Heparin 5000


 units/ml)  5,000 units  EVERY 12  HOURS


 SUBQ


   9/27/18 21:00


 10/27/18 20:59  9/29/18 09:29


 


 


 Hydromorphone HCl


  (Dilaudid)  3 mg  Q3H  PRN


 IVP


 severe pain  9/28/18 14:15


 10/4/18 14:14  9/29/18 10:52


 


 


 Lorazepam


  (Ativan 2mg/ml


 1ml)  1 mg  Q4H  PRN


 IV


 agitation  9/27/18 12:00


 10/4/18 11:59   


 


 


 Metoclopramide HCl


  (Reglan)  10 mg  Q6H  PRN


 IVP


 severe nausea  9/27/18 12:00


 10/27/18 11:59   


 


 


 Nitroglycerin


  (Ntg)  0.4 mg  Q5M X 3 DOSES PRN


 SL


 Prn Chest Pain  9/27/18 12:00


 10/27/18 11:59   


 


 


 Ondansetron HCl


  (Zofran)  4 mg  Q6H  PRN


 IVP


 Nausea & Vomiting  9/27/18 12:00


 10/27/18 11:59   


 


 


 Pantoprazole


  (Protonix)  40 mg  DAILY


 ORAL


   9/29/18 09:00


 10/29/18 08:59  9/29/18 09:25


 


 


 Patient Own


 Medication


  (Patient's Own


 Med)  1 ea  DAILY


 ORAL


   9/28/18 09:00


 10/28/18 08:59  9/29/18 09:29


 


 


 Patient Own


 Medication


  (Patient's Own


 Med)  1 ea  DAILY


 ORAL


   9/28/18 09:00


 10/28/18 08:59  9/29/18 09:29


 


 


 Polyethylene


 Glycol


  (Miralax)  17 gm  HSPRN  PRN


 ORAL


 Constipation  9/27/18 12:39


 10/27/18 12:38  9/29/18 09:26


 


 


 Pregabalin


  (Lyrica)  25 mg  THREE TIMES A  DAY


 ORAL


   9/28/18 18:00


 10/28/18 17:59  9/29/18 09:25


 


 


 Promethazine HCl


  (Phenergan)  25 mg  Q6H  PRN


 IM


 REFRACTORY N/V  9/27/18 12:48


 10/27/18 12:47   


 

















Can Corrales MD Sep 29, 2018 12:02

## 2018-09-29 NOTE — GENERAL PROGRESS NOTE
Assessment/Plan


Problem List:  


(1) Constipation


ICD Codes:  K59.00 - Constipation, unspecified


SNOMED:  18783426


(2) Chronic subdural hematoma


ICD Codes:  I62.03 - Nontraumatic chronic subdural hemorrhage


SNOMED:  00508383


(3) HIV disease


ICD Codes:  B20 - HIV disease


SNOMED:  26136005


(4) Intractable vomiting


ICD Codes:  R11.10 - Vomiting, unspecified


SNOMED:  252499568


(5) Intractable back pain


ICD Codes:  M54.9 - Intractable back pain


SNOMED:  987619103


(6) History of colon polyps


ICD Codes:  Z86.010 - Personal history of colonic polyps


SNOMED:  713353582


(7) Diverticulosis


ICD Codes:  K57.90 - Diverticulosis of intestine, part unspecified, without 

perforation or abscess without bleeding


SNOMED:  560340916


Assessment/Plan


hx of spinal surgery


symptomatic treatment at this time


GI cocktail prn


zofran prn


regular diet


ppi


pain mgmt


bowel regime


last BM 9/27


patient is on plavix


fu labs





Subjective


ROS Limited/Unobtainable:  Yes


Allergies:  


Coded Allergies:  


     ACETAMINOPHEN (Verified  Allergy, Unknown, 3/24/10)


     ASPIRIN (Verified  Allergy, Unknown, 12/7/09)


     HYDROCODONE (Verified  Allergy, Unknown, 3/24/10)


     MORPHINE (Verified  Allergy, Unknown, 12/7/09)





Objective





Last 24 Hour Vital Signs








  Date Time  Temp Pulse Resp B/P (MAP) Pulse Ox O2 Delivery O2 Flow Rate FiO2


 


9/29/18 06:00    104/66    


 


9/29/18 04:00 97.3 60 18 104/66 (79) 97   





 97.3       


 


9/29/18 00:00 98.1 72 18 118/78 (91) 99   





 98.1       


 


9/28/18 23:55    118/78    


 


9/28/18 21:00      Room Air  


 


9/28/18 20:00 97.5 62 18 102/66 (78) 98   





 97.5       


 


9/28/18 17:40 97.8       


 


9/28/18 17:40 97.8       


 


9/28/18 17:16 97.8       


 


9/28/18 17:16    116/74    


 


9/28/18 17:10 97.8       


 


9/28/18 16:00 97.0 59 18 116/74 (88) 99   





 97.0       


 


9/28/18 13:59 97.8       


 


9/28/18 13:29 97.8       


 


9/28/18 12:45 97.8       


 


9/28/18 12:15 97.8       


 


9/28/18 12:15    119/69    


 


9/28/18 12:00 97.8 55 20 119/69 (86) 98   





 97.8       


 


9/28/18 09:05 98.6       


 


9/28/18 09:02  74  114/76    


 


9/28/18 09:00      Room Air  

















Intake and Output  


 


 9/28/18 9/29/18





 19:00 07:00


 


Intake Total 975 ml 825 ml


 


Balance 975 ml 825 ml


 


  


 


IV Total 975 ml 825 ml








Laboratory Tests


9/29/18 07:25: 


White Blood Count 8.2, Red Blood Count 4.51L, Hemoglobin 15.3, Hematocrit 45.5, 

Mean Corpuscular Volume 101H, Mean Corpuscular Hemoglobin 34.0H, Mean 

Corpuscular Hemoglobin Concent 33.7, Red Cell Distribution Width 11.5L, 

Platelet Count 210, Mean Platelet Volume 5.2L, Neutrophils (%) (Auto) 56.8, 

Lymphocytes (%) (Auto) 33.3, Monocytes (%) (Auto) 7.3, Eosinophils (%) (Auto) 

1.6, Basophils (%) (Auto) 1.1, Sodium Level 132L, Potassium Level 3.9, Chloride 

Level 97L, Carbon Dioxide Level 29, Anion Gap 6, Blood Urea Nitrogen 42H, 

Creatinine 1.7#H, Estimat Glomerular Filtration Rate , Glucose Level 78, 

Calcium Level 9.1


Height (Feet):  5


Height (Inches):  11.00


Weight (Pounds):  173


General Appearance:  alert


EENT:  normal ENT inspection


Neck:  supple


Cardiovascular:  normal rate


Respiratory/Chest:  decreased breath sounds


Abdomen:  soft, hypoactive bowel sounds


Extremities:  non-tender











Mu Ramirez MD Sep 29, 2018 08:17

## 2018-09-30 VITALS — DIASTOLIC BLOOD PRESSURE: 68 MMHG | SYSTOLIC BLOOD PRESSURE: 101 MMHG

## 2018-09-30 VITALS — SYSTOLIC BLOOD PRESSURE: 124 MMHG | DIASTOLIC BLOOD PRESSURE: 69 MMHG

## 2018-09-30 VITALS — SYSTOLIC BLOOD PRESSURE: 126 MMHG | DIASTOLIC BLOOD PRESSURE: 73 MMHG

## 2018-09-30 VITALS — SYSTOLIC BLOOD PRESSURE: 116 MMHG | DIASTOLIC BLOOD PRESSURE: 68 MMHG

## 2018-09-30 VITALS — SYSTOLIC BLOOD PRESSURE: 101 MMHG | DIASTOLIC BLOOD PRESSURE: 65 MMHG

## 2018-09-30 VITALS — DIASTOLIC BLOOD PRESSURE: 74 MMHG | SYSTOLIC BLOOD PRESSURE: 132 MMHG

## 2018-09-30 LAB
ADD MANUAL DIFF: NO
ANION GAP SERPL CALC-SCNC: 9 MMOL/L (ref 5–15)
BASOPHILS NFR BLD AUTO: 1.1 % (ref 0–2)
BUN SERPL-MCNC: 27 MG/DL (ref 7–18)
CALCIUM SERPL-MCNC: 8.2 MG/DL (ref 8.5–10.1)
CHLORIDE SERPL-SCNC: 105 MMOL/L (ref 98–107)
CO2 SERPL-SCNC: 25 MMOL/L (ref 21–32)
CREAT SERPL-MCNC: 1.2 MG/DL (ref 0.55–1.3)
EOSINOPHIL NFR BLD AUTO: 3 % (ref 0–3)
ERYTHROCYTE [DISTWIDTH] IN BLOOD BY AUTOMATED COUNT: 11.4 % (ref 11.6–14.8)
HCT VFR BLD CALC: 41.5 % (ref 42–52)
HGB BLD-MCNC: 13.8 G/DL (ref 14.2–18)
LYMPHOCYTES NFR BLD AUTO: 32.6 % (ref 20–45)
MCV RBC AUTO: 100 FL (ref 80–99)
MONOCYTES NFR BLD AUTO: 9.3 % (ref 1–10)
NEUTROPHILS NFR BLD AUTO: 54.1 % (ref 45–75)
PLATELET # BLD: 185 K/UL (ref 150–450)
POTASSIUM SERPL-SCNC: 3.6 MMOL/L (ref 3.5–5.1)
RBC # BLD AUTO: 4.17 M/UL (ref 4.7–6.1)
SODIUM SERPL-SCNC: 139 MMOL/L (ref 136–145)
WBC # BLD AUTO: 5.3 K/UL (ref 4.8–10.8)

## 2018-09-30 RX ADMIN — DOCUSATE SODIUM SCH MG: 100 CAPSULE, LIQUID FILLED ORAL at 17:23

## 2018-09-30 RX ADMIN — VALSARTAN SCH EA: 160 TABLET ORAL at 08:35

## 2018-09-30 RX ADMIN — PREGABALIN SCH MG: 25 CAPSULE ORAL at 08:29

## 2018-09-30 RX ADMIN — VALSARTAN SCH EA: 160 TABLET ORAL at 08:36

## 2018-09-30 RX ADMIN — HEPARIN SODIUM SCH UNITS: 5000 INJECTION INTRAVENOUS; SUBCUTANEOUS at 22:02

## 2018-09-30 RX ADMIN — DEXTROSE AND SODIUM CHLORIDE SCH MLS/HR: 5; .45 INJECTION, SOLUTION INTRAVENOUS at 17:24

## 2018-09-30 RX ADMIN — HEPARIN SODIUM SCH UNITS: 5000 INJECTION INTRAVENOUS; SUBCUTANEOUS at 08:34

## 2018-09-30 RX ADMIN — DEXTROSE AND SODIUM CHLORIDE SCH MLS/HR: 5; .45 INJECTION, SOLUTION INTRAVENOUS at 08:36

## 2018-09-30 RX ADMIN — PREGABALIN SCH MG: 25 CAPSULE ORAL at 17:22

## 2018-09-30 RX ADMIN — BISACODYL SCH MG: 5 TABLET, COATED ORAL at 21:00

## 2018-09-30 RX ADMIN — DOCUSATE SODIUM SCH MG: 100 CAPSULE, LIQUID FILLED ORAL at 08:29

## 2018-09-30 RX ADMIN — ATENOLOL SCH MG: 25 TABLET ORAL at 08:30

## 2018-09-30 RX ADMIN — PREGABALIN SCH MG: 25 CAPSULE ORAL at 12:04

## 2018-09-30 NOTE — GENERAL PROGRESS NOTE
Assessment/Plan


Assessment/Plan


(1) Chronic subdural hematoma


(2) DDD (degenerative disc disease), lumbar


(3) DDD (degenerative disc disease), cervical


(4) Lumbar herniated disc


(5) Lumbar radiculopathy


(6) Lumbar spondylosis


(7) Cervical spondylosis


(8) Failed back surgical syndrome


Pt will be continued on Dilaudid, Lyrica and Flexeril.


An Rx for Lyrica and Flexeril was sent to patients pharmacy of choice. 


Pt reports no need for Opioid RX due to having medication at home. 


Pt was d/w Dr. Dixon and he concurred.





Subjective


Date patient seen:  Sep 30, 2018


Time patient seen:  11:45 - am


Allergies:  


Coded Allergies:  


     ACETAMINOPHEN (Verified  Allergy, Unknown, 3/24/10)


     ASPIRIN (Verified  Allergy, Unknown, 12/7/09)


     HYDROCODONE (Verified  Allergy, Unknown, 3/24/10)


     MORPHINE (Verified  Allergy, Unknown, 12/7/09)


Subjective


Constitutional:  Denies: chills, diaphoresis, fever, malaise, no symptoms, other

, weakness


HEENT:  Denies: blurred vision, double vision, ear discharge, ear pain, eye pain

, mouth pain, mouth swelling, no symptoms, nose congestion, nose pain, other, 

tearing, throat pain, throat swelling


Cardiovascular:  Denies: chest pain, edema, irregular heart rate, 

lightheadedness, no symptoms, other, palpitations, syncope


Respiratory:  Denies: SOB at rest, SOB with excertion, cough, no symptoms, 

orthopnea, other, shortness of breath, sputum, stridor, wheezing


Gastrointestinal/Abdominal:  Denies: abdomen distended, abdominal pain, black 

stools, blood in stool, constipated, diarrhea, difficulty swallowing, nausea, 

no symptoms, other, poor appetite, poor fluid intake, rectal bleeding, tarry 

stools, vomiting


Genitourinary:  Denies: burning, discharge, flank pain, frequency, hematuria, 

incontinence, no symptoms, other, pain, urgency


Neurologic/Psychiatric:  Denies: anxiety, depressed, emotional problems, 

headache, no symptoms, numbness, other, paresthesia, pre-existing deficit, 

seizure, tingling, tremors, weakness


Endocrine:  Denies: excessive sweating, flushing, increased hunger, increased 

thirst, increased urine, intolerance to cold, intolerance to heat, no symptoms, 

other, unexplained weight gain, unexplained weight loss


Hematologic/Lymphatic:  Denies: anemia, easy bleeding, easy bruising, no 

symptoms, other





Subjective


Pt reports that his pain has been better controlled and is reducing with the 

start of the Lyrica. He is tolerating the pain on the Dilaudid 7 does in the 

last 24hrs. MRI was reviewed.





Objective





Last 24 Hour Vital Signs








  Date Time  Temp Pulse Resp B/P (MAP) Pulse Ox O2 Delivery O2 Flow Rate FiO2


 


9/30/18 12:00 98.3 52 18 101/68 (79) 98   





 98.3       


 


9/30/18 08:30  62  119/70    


 


9/30/18 08:00 99.0 92 20 126/73 (90) 98   





 99.0       


 


9/30/18 07:30      Room Air  


 


9/30/18 06:11    119/70    


 


9/30/18 04:00 98.1 62 18 116/68 (84) 99   





 98.1       


 


9/30/18 00:00 97.7 62 18 101/65 (77) 98   





 97.7       


 


9/29/18 23:45    101/65    


 


9/29/18 21:00      Room Air  


 


9/29/18 20:00 98.2 72 18 115/68 (84) 98   





 98.2       


 


9/29/18 17:44 97.8       


 


9/29/18 17:44 97.8       


 


9/29/18 17:14 97.8       


 


9/29/18 17:14 97.8       


 


9/29/18 17:13    130/79    


 


9/29/18 15:56 97.8 65 18 130/79 (96) 98   





 97.8       


 


9/29/18 14:07 98.3       


 


9/29/18 13:42 98.3       

















Intake and Output  


 


 9/29/18 9/30/18





 19:00 07:00


 


Intake Total 1075 ml 1100 ml


 


Balance 1075 ml 1100 ml


 


  


 


IV Total 1075 ml 1100 ml








Laboratory Tests


9/30/18 08:40: 


White Blood Count 5.3, Red Blood Count 4.17L, Hemoglobin 13.8L, Hematocrit 41.5L

, Mean Corpuscular Volume 100H, Mean Corpuscular Hemoglobin 33.1H, Mean 

Corpuscular Hemoglobin Concent 33.2, Red Cell Distribution Width 11.4L, 

Platelet Count 185, Mean Platelet Volume 5.1L, Neutrophils (%) (Auto) 54.1, 

Lymphocytes (%) (Auto) 32.6, Monocytes (%) (Auto) 9.3, Eosinophils (%) (Auto) 

3.0, Basophils (%) (Auto) 1.1, Sodium Level 139, Potassium Level 3.6, Chloride 

Level 105, Carbon Dioxide Level 25, Anion Gap 9, Blood Urea Nitrogen 27H, 

Creatinine 1.2, Estimat Glomerular Filtration Rate , Glucose Level 119H, 

Calcium Level 8.2L, C-Reactive Protein, Quantitative < 0.4


Height (Feet):  5


Height (Inches):  11.00


Weight (Pounds):  173


Objective


General Appearance:  no apparent distress, alert


EENT:  PERRL/EOMI


Neck:  normal alignment, supple


Cardiovascular:  normal rate, regular rhythm


Respiratory/Chest:  lungs clear, normal breath sounds


Abdomen:  non tender, soft


Extremities:  non-tender


Neurologic:  alert, oriented x 3


Skin:  warm/dry





Procedure: MRI L Spine no Contrast


Indication: Lumbar spine weakness, radiculopathy


 


Technique: Sagittal T1 and T2 fast spin echo, sagittal STIR, axial T1 and T2 

fast


spin-echo images of the lumbar spine


 


Comparison: 12/15/2016


 


Findings: Bony alignment is normal. Vertebral body heights are preserved. Disc 

spaces


are preserved. There is anterior fusion hardware bridging L4-5 and L5-S1. This 

throws


off of susceptibility artifact which may obscure surrounding pathology, 

particularly


within the vertebral bodies and at the L4-5 and L5-S1 discs. The conus 

medullaris


terminates at the T12 level


 


At L2-3, there is circumferential annular bulge. There is a high intensity zone


within the posterior aspect of the bulging disc again demonstrated. This 

results in


borderline narrowing of the spinal canal. There is mild right neural foraminal


compromise by the bulging disc. The disc space is preserved. Findings at this 

level


are unchanged.


 


At L3-4, there is circumferential annular bulge. There is a high intensity zone


within the posterior aspect of the bulging disc. There is only borderline 

narrowing


of the spinal canal at this level. There is bilateral facet arthrosis. There is


minimal compromise of the neural foramina by the bulging disc and the facet


arthrosis.


 


At L4-5, there appears to be some degenerative disc narrowing, although the 

disc is


largely obscured by artifact from the surrounding hardware. No significant disc 

bulge


or protrusion, spinal stenosis, or neural foraminal narrowing.


 


At L5-S1, the disc is largely obscured by artifact from the surrounding hardware

, is


probably somewhat narrowed. No significant disc bulge or protrusion or spinal


stenosis. There is evidence of mild neural foraminal compromise largely by


osteophytes.


 


At the remaining levels, no significant disc bulge or protrusion, spinal 

stenosis,


disc narrowing, or neural foraminal stenosis.


 


The included extraspinal soft tissues are unremarkable.


 


Findings are unchanged from prior exam of 12/15/2016


 


Impression: Degenerative changes at L2-3 and L3-4, resulting in mild spinal 

canal and


neural foraminal compromise, unchanged from earlier study of 12/15/2016


 


Postsurgical changes at L4-5 and L5-S1, as described. Note that susceptibility


artifact from such could obscure pathology of this level. No evidence of neural


compromise except for minimal neural foraminal stenosis at L5-S1 bilaterally











Jean Beltran Sep 30, 2018 12:15

## 2018-09-30 NOTE — GENERAL PROGRESS NOTE
Assessment/Plan


Problem List:  


(1) Constipation


ICD Codes:  K59.00 - Constipation, unspecified


SNOMED:  29085086


(2) Chronic subdural hematoma


ICD Codes:  I62.03 - Nontraumatic chronic subdural hemorrhage


SNOMED:  35990841


(3) HIV disease


ICD Codes:  B20 - HIV disease


SNOMED:  10130324


(4) Intractable vomiting


ICD Codes:  R11.10 - Vomiting, unspecified


SNOMED:  757088084


(5) Intractable back pain


ICD Codes:  M54.9 - Intractable back pain


SNOMED:  718286630


(6) History of colon polyps


ICD Codes:  Z86.010 - Personal history of colonic polyps


SNOMED:  831471919


(7) Diverticulosis


ICD Codes:  K57.90 - Diverticulosis of intestine, part unspecified, without 

perforation or abscess without bleeding


SNOMED:  162194374


Assessment/Plan


hx of spinal surgery


symptomatic treatment at this time


GI cocktail prn


zofran prn


regular diet


ppi


pain mgmt


bowel regime


last BM 9/27


patient is on plavix


fu labs





Subjective


ROS Limited/Unobtainable:  Yes


Allergies:  


Coded Allergies:  


     ACETAMINOPHEN (Verified  Allergy, Unknown, 3/24/10)


     ASPIRIN (Verified  Allergy, Unknown, 12/7/09)


     HYDROCODONE (Verified  Allergy, Unknown, 3/24/10)


     MORPHINE (Verified  Allergy, Unknown, 12/7/09)





Objective





Last 24 Hour Vital Signs








  Date Time  Temp Pulse Resp B/P (MAP) Pulse Ox O2 Delivery O2 Flow Rate FiO2


 


9/30/18 06:11    119/70    


 


9/30/18 04:00 98.1 62 18 116/68 (84) 99   





 98.1       


 


9/30/18 00:00 97.7 62 18 101/65 (77) 98   





 97.7       


 


9/29/18 23:45    101/65    


 


9/29/18 21:00      Room Air  


 


9/29/18 20:00 98.2 72 18 115/68 (84) 98   





 98.2       


 


9/29/18 17:44 97.8       


 


9/29/18 17:44 97.8       


 


9/29/18 17:14 97.8       


 


9/29/18 17:14 97.8       


 


9/29/18 17:13    130/79    


 


9/29/18 15:56 97.8 65 18 130/79 (96) 98   





 97.8       


 


9/29/18 14:07 98.3       


 


9/29/18 13:42 98.3       


 


9/29/18 12:00 98.3 59 18 108/66 (80) 96   





 98.3       


 


9/29/18 12:00    108/66    


 


9/29/18 10:52 98.1       


 


9/29/18 09:25 98.1       


 


9/29/18 09:00  67  103/63    


 


9/29/18 09:00      Room Air  


 


9/29/18 08:00 98.1 67 18 103/63 (76) 98   





 98.1       

















Intake and Output  


 


 9/29/18 9/30/18





 19:00 07:00


 


Intake Total 1075 ml 1100 ml


 


Balance 1075 ml 1100 ml


 


  


 


IV Total 1075 ml 1100 ml








Laboratory Tests


9/29/18 07:25: 


White Blood Count 8.2, Red Blood Count 4.51L, Hemoglobin 15.3, Hematocrit 45.5, 

Mean Corpuscular Volume 101H, Mean Corpuscular Hemoglobin 34.0H, Mean 

Corpuscular Hemoglobin Concent 33.7, Red Cell Distribution Width 11.5L, 

Platelet Count 210, Mean Platelet Volume 5.2L, Neutrophils (%) (Auto) 56.8, 

Lymphocytes (%) (Auto) 33.3, Monocytes (%) (Auto) 7.3, Eosinophils (%) (Auto) 

1.6, Basophils (%) (Auto) 1.1, Sodium Level 132L, Potassium Level 3.9, Chloride 

Level 97L, Carbon Dioxide Level 29, Anion Gap 6, Blood Urea Nitrogen 42H, 

Creatinine 1.7#H, Estimat Glomerular Filtration Rate , Glucose Level 78, 

Calcium Level 9.1


Height (Feet):  5


Height (Inches):  11.00


Weight (Pounds):  173


General Appearance:  alert


EENT:  normal ENT inspection


Neck:  supple


Cardiovascular:  normal rate


Respiratory/Chest:  decreased breath sounds


Abdomen:  normal bowel sounds, non tender, soft


Extremities:  non-tender











Mu Ramirez MD Sep 30, 2018 06:56

## 2018-09-30 NOTE — PULMONOLOGY PROGRESS NOTE
Assessment/Plan


Assessment/Plan


ASSESSMENT 


acute renal failure - resolving 


abdominal pain with intractable vomiting 


constipation 


diverticulosis 


hypertension  


COPD


HIV status  


HTN


chronic subdural hematoma 


intractable back pain 


DDD L-spine 


DDD C-spine


lumbar herniated disc 


lumbar radiculopathy 


lumbar spondylosis 


cervical spondylosis 


failed back surgery syndrome   





PLAN OF CARE 


[facility] floor  


IVF  


stat renal US  negative, ( done 9/29 stat) 


ARF  -resolving , cerat down to 1.2


nephro eval pending for this am


 





CT A/P diverticulosis w/out diverticulitis 


abdominal US   + cholelithiasis, no dilated ducts, nonobstructive bilateral 

nephrolithiases   


GI follows


GI cocktail prn


a/emetic prn 


regular diet, monitor tolerance 


GI prophylaxis with PPI 


meticulous bowel regimen ( last BM Thursday 9/27/) 


last BM 9/27





MRI L spine with degenerative changes at L2-3 and L3-4, resulting in mild 

spinal canal and neural foraminal compromise, unchanged from earlier study of 12

/15/2016 


pain specialist follows


pain management as per pain specialist recs with multi approach 


PT/OT 


fall precautions   





O2 HHN prn 


BP management with BB, 


continue a/PLT therapy with Plavix


DVT prophylaxis   





case discussed and evaluated by supervising physician





Subjective


Allergies:  


Coded Allergies:  


     ACETAMINOPHEN (Verified  Allergy, Unknown, 3/24/10)


     ASPIRIN (Verified  Allergy, Unknown, 12/7/09)


     HYDROCODONE (Verified  Allergy, Unknown, 3/24/10)


     MORPHINE (Verified  Allergy, Unknown, 12/7/09)


Subjective


pain better controlled, but still significant no CP no SOB 


pulse oz stable on RA   


creat down to 1.2





Objective





Last 24 Hour Vital Signs








  Date Time  Temp Pulse Resp B/P (MAP) Pulse Ox O2 Delivery O2 Flow Rate FiO2


 


9/30/18 06:11    119/70    


 


9/30/18 04:00 98.1 62 18 116/68 (84) 99   





 98.1       


 


9/30/18 00:00 97.7 62 18 101/65 (77) 98   





 97.7       


 


9/29/18 23:45    101/65    


 


9/29/18 21:00      Room Air  


 


9/29/18 20:00 98.2 72 18 115/68 (84) 98   





 98.2       


 


9/29/18 17:44 97.8       


 


9/29/18 17:44 97.8       


 


9/29/18 17:14 97.8       


 


9/29/18 17:14 97.8       


 


9/29/18 17:13    130/79    


 


9/29/18 15:56 97.8 65 18 130/79 (96) 98   





 97.8       


 


9/29/18 14:07 98.3       


 


9/29/18 13:42 98.3       


 


9/29/18 12:00 98.3 59 18 108/66 (80) 96   





 98.3       


 


9/29/18 12:00    108/66    


 


9/29/18 10:52 98.1       


 


9/29/18 09:25 98.1       


 


9/29/18 09:00  67  103/63    


 


9/29/18 09:00      Room Air  


 


9/29/18 08:00 98.1 67 18 103/63 (76) 98   





 98.1       

















Intake and Output  


 


 9/29/18 9/30/18





 19:00 07:00


 


Intake Total 1075 ml 1100 ml


 


Balance 1075 ml 1100 ml


 


  


 


IV Total 1075 ml 1100 ml








Objective


General Appearance:  WD/WN, no acute distress


HEENT:  normocephalic, atraumatic, anicteric, mucous membranes moist


Respiratory/Chest:  lungs clear - with moderate air exchange , no respiratory 

distress, no accessory muscle use


Cardiovascular:  normal rate


Abdomen:  normal bowel sounds, soft, non tender, non distended


Extremities:  no edema


Neurologic/Psychiatric:  abnormal gait, alert, oriented x 3, responsive


Musculoskeletal:  normal muscle bulk





Microbiology








 Date/Time


Source Procedure


Growth Status


 


 


 9/27/18 20:50


Nasal Nares MRSA Culture - Final


NO METHICILLIN RESISTANT STAPH AUREUS... Complete


 


 9/27/18 20:50


Rectum VRE Culture - Final


NO VANCOMYCIN RESISTANT ENTEROCOCCUS ... Complete


 


 9/27/18 20:50


Rectum  Received











Current Medications








 Medications


  (Trade)  Dose


 Ordered  Sig/Melania


 Route


 PRN Reason  Start Time


 Stop Time Status Last Admin


Dose Admin


 


 Atenolol


  (Tenormin)  25 mg  DAILY


 ORAL


   9/28/18 09:00


 10/28/18 08:59  9/28/18 09:02


 


 


 Bisacodyl


  (Dulcolax)  5 mg  DAILY@2100


 ORAL


   9/29/18 21:00


 10/29/18 20:59  9/29/18 20:29


 


 


 Clonidine HCl


  (Catapres Tab)  0.1 mg  EVERY 6  HOURS


 ORAL


   9/27/18 18:00


 10/27/18 17:59  9/30/18 06:11


 


 


 Clonidine HCl


  (Catapres Tab)  0.1 mg  Q6H  PRN


 ORAL


 For High Blood Pressure  9/27/18 15:30


 10/27/18 15:29   


 


 


 Clopidogrel


 Bisulfate


  (Plavix)  75 mg  DAILY


 ORAL


   9/28/18 09:00


 10/28/18 08:59  9/29/18 09:25


 


 


 Cyclobenzaprine


 HCl


  (Flexeril)  10 mg  TIDPRN  PRN


 ORAL


 Muscle Spasm  9/28/18 15:00


 10/28/18 14:59   


 


 


 Dextrose


  (Dextrose 50%)  25 ml  Q30M  PRN


 IV


 Hypoglycemia  9/27/18 12:00


 10/27/18 11:59   


 


 


 Dextrose


  (Dextrose 50%)  50 ml  Q30M  PRN


 IV


 Hypoglycemia  9/27/18 12:15


 10/27/18 12:14   


 


 


 Dextrose/Sodium


 Chloride  1,000 ml @ 


 100 mls/hr  Q10H


 IV


   9/29/18 12:00


 10/27/18 11:59  9/29/18 22:11


 


 


 Diazepam


  (Valium)  10 mg  HSPRN  PRN


 ORAL


 Insomnia  9/28/18 15:45


 10/5/18 15:44  9/29/18 23:52


 


 


 Diphenhydramine


 HCl


  (Benadryl)  25 mg  Q6H  PRN


 ORAL


 Itching/Pruritis  9/27/18 12:00


 10/27/18 11:59   


 


 


 Docusate Sodium


  (Colace)  100 mg  TWICE A  DAY


 ORAL


   9/29/18 09:00


 10/29/18 08:59  9/29/18 17:14


 


 


 Heparin Sodium


  (Porcine)


  (Heparin 5000


 units/ml)  5,000 units  EVERY 12  HOURS


 SUBQ


   9/27/18 21:00


 10/27/18 20:59  9/29/18 20:29


 


 


 Hydromorphone HCl


  (Dilaudid)  3 mg  Q3H  PRN


 IVP


 severe pain  9/28/18 14:15


 10/4/18 14:14  9/30/18 04:41


 


 


 Lorazepam


  (Ativan 2mg/ml


 1ml)  1 mg  Q4H  PRN


 IV


 agitation  9/27/18 12:00


 10/4/18 11:59   


 


 


 Metoclopramide HCl


  (Reglan)  10 mg  Q6H  PRN


 IVP


 severe nausea  9/27/18 12:00


 10/27/18 11:59   


 


 


 Nitroglycerin


  (Ntg)  0.4 mg  Q5M X 3 DOSES PRN


 SL


 Prn Chest Pain  9/27/18 12:00


 10/27/18 11:59   


 


 


 Ondansetron HCl


  (Zofran)  4 mg  Q6H  PRN


 IVP


 Nausea & Vomiting  9/27/18 12:00


 10/27/18 11:59   


 


 


 Pantoprazole


  (Protonix)  40 mg  DAILY


 ORAL


   9/29/18 09:00


 10/29/18 08:59  9/29/18 09:25


 


 


 Patient Own


 Medication


  (Patient's Own


 Med)  1 ea  DAILY


 ORAL


   9/28/18 09:00


 10/28/18 08:59  9/29/18 09:29


 


 


 Patient Own


 Medication


  (Patient's Own


 Med)  1 ea  DAILY


 ORAL


   9/28/18 09:00


 10/28/18 08:59  9/29/18 09:29


 


 


 Polyethylene


 Glycol


  (Miralax)  17 gm  HSPRN  PRN


 ORAL


 Constipation  9/27/18 12:39


 10/27/18 12:38  9/29/18 09:26


 


 


 Pregabalin


  (Lyrica)  25 mg  THREE TIMES A  DAY


 ORAL


   9/28/18 18:00


 10/28/18 17:59  9/29/18 17:14


 


 


 Promethazine HCl


  (Phenergan)  25 mg  Q6H  PRN


 IM


 REFRACTORY N/V  9/27/18 12:48


 10/27/18 12:47   


 

















Natalie Govea NP Sep 30, 2018 07:46

## 2018-09-30 NOTE — CONSULTATION
Consult Note


Consult Note





asked to eval for rising Cr





Patient presents emergency department today complaint generalized weakness, 

vomiting, decreased by mouth intake and abdominal pain.  Patient states that he 

has had symptoms like this in the past.  He is uncertain where this came from.  

He was told that he has history of gallstones.  Patient denies any fever chest 

pain or shortness of breath but appears very weak.  He states that he's really 

too weak to eat or walk.  His primary care physician is Dr. Arreola.  No other 

complaints are noted.  Symptoms noted to be severe.No other modifying factors.  

No other associated signs and symptoms.  No other complaints were noted.


Allergies:  


Coded Allergies:  


     ACETAMINOPHEN (Verified  Allergy, Unknown, 3/24/10)


     ASPIRIN (Verified  Allergy, Unknown, 12/7/09)


     HYDROCODONE (Verified  Allergy, Unknown, 3/24/10)


     MORPHINE (Verified  Allergy, Unknown, 12/7/09)








Past Medical History:  HTN, COPD, HIV


Past Surgical History:  other - Back surgery, subdural hematoma





Past Medical History:  No History, Except For


Hx Cardiac Problems:  Yes - HIV


Hx Hypertension:  Yes


Hx Asthma:  Yes


Hx COPD:  Yes


Hx Neurological Problems:  Yes - Back surgery


Hx Cerebrovascular Accident:  Yes


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


Hx Weakness:  Yes


Hx Fatigue:  Yes


Hx Brain Shunt:  Yes


Assessment/Plan








- Acute renal failure, improved. Cr 1.7 down to 1.2 + Proteinuria


   partly dehydration





abdominal pain with intractable vomiting 


constipation diverticulosis 


hypertension  


COPD


HIV status on anti viral therapy


chronic subdural hematoma 


intractable back pain 


failed back surgery syndrome   








Plan:





Hydrate-


Urine studies-


avoid nephrotoxics


Monitor renal parameters-


continue per consultants-


Pain management











Osman Trejo MD Sep 30, 2018 11:32

## 2018-10-01 VITALS — SYSTOLIC BLOOD PRESSURE: 119 MMHG | DIASTOLIC BLOOD PRESSURE: 69 MMHG

## 2018-10-01 VITALS — SYSTOLIC BLOOD PRESSURE: 123 MMHG | DIASTOLIC BLOOD PRESSURE: 71 MMHG

## 2018-10-01 VITALS — SYSTOLIC BLOOD PRESSURE: 120 MMHG | DIASTOLIC BLOOD PRESSURE: 73 MMHG

## 2018-10-01 VITALS — SYSTOLIC BLOOD PRESSURE: 121 MMHG | DIASTOLIC BLOOD PRESSURE: 72 MMHG

## 2018-10-01 VITALS — SYSTOLIC BLOOD PRESSURE: 128 MMHG | DIASTOLIC BLOOD PRESSURE: 73 MMHG

## 2018-10-01 VITALS — SYSTOLIC BLOOD PRESSURE: 128 MMHG | DIASTOLIC BLOOD PRESSURE: 76 MMHG

## 2018-10-01 LAB
ADD MANUAL DIFF: NO
ALBUMIN SERPL-MCNC: 2.8 G/DL (ref 3.4–5)
ALBUMIN/GLOB SERPL: 0.8 {RATIO} (ref 1–2.7)
ALP SERPL-CCNC: 122 U/L (ref 46–116)
ALT SERPL-CCNC: 40 U/L (ref 12–78)
ANION GAP SERPL CALC-SCNC: 6 MMOL/L (ref 5–15)
AST SERPL-CCNC: 31 U/L (ref 15–37)
BASOPHILS NFR BLD AUTO: 1.1 % (ref 0–2)
BILIRUB SERPL-MCNC: 0.2 MG/DL (ref 0.2–1)
BUN SERPL-MCNC: 15 MG/DL (ref 7–18)
CALCIUM SERPL-MCNC: 8.1 MG/DL (ref 8.5–10.1)
CHLORIDE SERPL-SCNC: 108 MMOL/L (ref 98–107)
CHOLEST SERPL-MCNC: 111 MG/DL (ref ?–200)
CK SERPL-CCNC: 329 U/L (ref 26–308)
CO2 SERPL-SCNC: 27 MMOL/L (ref 21–32)
CREAT SERPL-MCNC: 1 MG/DL (ref 0.55–1.3)
EOSINOPHIL NFR BLD AUTO: 4 % (ref 0–3)
ERYTHROCYTE [DISTWIDTH] IN BLOOD BY AUTOMATED COUNT: 11.5 % (ref 11.6–14.8)
GAMMA GLUTAMYL TRANSPEPTIDASE: 63 U/L (ref 5–85)
GLOBULIN SER-MCNC: 3.3 G/DL
HCT VFR BLD CALC: 37.9 % (ref 42–52)
HDLC SERPL-MCNC: 51 MG/DL (ref 40–60)
HGB BLD-MCNC: 13.2 G/DL (ref 14.2–18)
LYMPHOCYTES NFR BLD AUTO: 36.3 % (ref 20–45)
MCV RBC AUTO: 100 FL (ref 80–99)
MONOCYTES NFR BLD AUTO: 9.1 % (ref 1–10)
NEUTROPHILS NFR BLD AUTO: 49.4 % (ref 45–75)
PHOSPHATE SERPL-MCNC: 3.1 MG/DL (ref 2.5–4.9)
PLATELET # BLD: 172 K/UL (ref 150–450)
POTASSIUM SERPL-SCNC: 3.5 MMOL/L (ref 3.5–5.1)
RBC # BLD AUTO: 3.78 M/UL (ref 4.7–6.1)
SODIUM SERPL-SCNC: 141 MMOL/L (ref 136–145)
TRIGL SERPL-MCNC: 50 MG/DL (ref 30–150)
WBC # BLD AUTO: 4 K/UL (ref 4.8–10.8)

## 2018-10-01 RX ADMIN — DEXTROSE AND SODIUM CHLORIDE SCH MLS/HR: 5; .45 INJECTION, SOLUTION INTRAVENOUS at 12:38

## 2018-10-01 RX ADMIN — HEPARIN SODIUM SCH UNITS: 5000 INJECTION INTRAVENOUS; SUBCUTANEOUS at 08:53

## 2018-10-01 RX ADMIN — PREGABALIN SCH MG: 25 CAPSULE ORAL at 13:51

## 2018-10-01 RX ADMIN — HEPARIN SODIUM SCH UNITS: 5000 INJECTION INTRAVENOUS; SUBCUTANEOUS at 21:01

## 2018-10-01 RX ADMIN — PREGABALIN SCH MG: 25 CAPSULE ORAL at 08:47

## 2018-10-01 RX ADMIN — ATENOLOL SCH MG: 25 TABLET ORAL at 08:53

## 2018-10-01 RX ADMIN — DOCUSATE SODIUM SCH MG: 100 CAPSULE, LIQUID FILLED ORAL at 17:55

## 2018-10-01 RX ADMIN — BISACODYL SCH MG: 5 TABLET, COATED ORAL at 20:58

## 2018-10-01 RX ADMIN — VALSARTAN SCH EA: 160 TABLET ORAL at 08:48

## 2018-10-01 RX ADMIN — DEXTROSE AND SODIUM CHLORIDE SCH MLS/HR: 5; .45 INJECTION, SOLUTION INTRAVENOUS at 04:12

## 2018-10-01 RX ADMIN — PREGABALIN SCH MG: 25 CAPSULE ORAL at 17:55

## 2018-10-01 RX ADMIN — DOCUSATE SODIUM SCH MG: 100 CAPSULE, LIQUID FILLED ORAL at 08:48

## 2018-10-01 NOTE — INFECTIOUS DISEASES PROG NOTE
Assessment/Plan


Assessment/Plan


72 yo male with





Abdominal pain and vomiting


   - Likely gastroenteritis vs food poisoning 


   - US of abd : no sig findings


   - 9/27 CT abd/pel   Nonobstructive stones in the left kidney and question of 

a tiny nonobstructive stone in the right kidney. Mild diverticulosis of the 

colon Small right inguinal hernia containing fluid versus undescended 

testis.Lower lumbar interbody fusion.





 HIV - Controlled


  - On Truvada and Nevirapine








LBP: 


 MRI: Degenerative changes at L2-3 and L3-4, resulting in mild spinal canal and 

neural foraminal compromise, unchanged from earlier study of 12/15/2016


        Postsurgical changes at L4-5 and L5-S1, as described. Note that 

susceptibility artifact from such could obscure pathology of this level. No 

evidence of neural compromise except for minimal neural foraminal stenosis at L5

-S1 bilaterally


HTN


COPD








PLAN:





- Continue home Truvada and Nevirapine


- Monitor abdominal symptoms


- Monitor Temps and CBC


- Supportive care





Subjective


Allergies:  


Coded Allergies:  


     ACETAMINOPHEN (Verified  Allergy, Unknown, 3/24/10)


     ASPIRIN (Verified  Allergy, Unknown, 12/7/09)


     HYDROCODONE (Verified  Allergy, Unknown, 3/24/10)


     MORPHINE (Verified  Allergy, Unknown, 12/7/09)


Subjective


afebrile


no leukocytosis


off abx





Objective


Vital Signs





Last 24 Hour Vital Signs








  Date Time  Temp Pulse Resp B/P (MAP) Pulse Ox O2 Delivery O2 Flow Rate FiO2


 


10/1/18 12:38 98.0       


 


10/1/18 11:49  82 20   Room Air  


 


10/1/18 11:47 98.0 69 20 121/72 (88) 98   





 98.0       


 


10/1/18 09:18 98.1       


 


10/1/18 09:17 98.1       


 


10/1/18 08:53  66  119/69    


 


10/1/18 08:48 98.1       


 


10/1/18 08:47 98.1       


 


10/1/18 08:15      Room Air  


 


10/1/18 08:00 98.9 66 20 119/69 (86) 99   





 98.9       


 


10/1/18 05:43    123/71    


 


10/1/18 04:00 98.1 63 19 123/71 (88) 100   





 98.1       


 


10/1/18 00:00 98.4 61 20 128/73 (91) 100   





 98.4       


 


9/30/18 22:00    124/69    


 


9/30/18 21:00      Room Air  


 


9/30/18 20:18  88 20   Room Air  


 


9/30/18 20:00 98.2 58 19 124/69 (87) 98   





 98.2       


 


9/30/18 16:00 98.1 91 18 132/74 (93) 92   





 98.1       








Height (Feet):  5


Height (Inches):  11.00


Weight (Pounds):  173


Objective


General Appearance:  WD/WN


HEENT:  normocephalic, anicteric


Respiratory/Chest:  chest wall non-tender, lungs clear


Cardiovascular:  normal peripheral pulses, normal rate


Abdomen:  normal bowel sounds, soft, non tender


Genitourinary:  normal external genitalia


Extremities:  no clubbing





Laboratory Tests








Test


  10/1/18


05:00


 


White Blood Count


  4.0 K/UL


(4.8-10.8)  L


 


Red Blood Count


  3.78 M/UL


(4.70-6.10)  L


 


Hemoglobin


  13.2 G/DL


(14.2-18.0)  L


 


Hematocrit


  37.9 %


(42.0-52.0)  L


 


Mean Corpuscular Volume


  100 FL (80-99)


H


 


Mean Corpuscular Hemoglobin


  34.8 PG


(27.0-31.0)  H


 


Mean Corpuscular Hemoglobin


Concent 34.7 G/DL


(32.0-36.0)


 


Red Cell Distribution Width


  11.5 %


(11.6-14.8)  L


 


Platelet Count


  172 K/UL


(150-450)


 


Mean Platelet Volume


  5.5 FL


(6.5-10.1)  L


 


Neutrophils (%) (Auto)


  49.4 %


(45.0-75.0)


 


Lymphocytes (%) (Auto)


  36.3 %


(20.0-45.0)


 


Monocytes (%) (Auto)


  9.1 %


(1.0-10.0)


 


Eosinophils (%) (Auto)


  4.0 %


(0.0-3.0)  H


 


Basophils (%) (Auto)


  1.1 %


(0.0-2.0)


 


Sodium Level


  141 MMOL/L


(136-145)


 


Potassium Level


  3.5 MMOL/L


(3.5-5.1)


 


Chloride Level


  108 MMOL/L


()  H


 


Carbon Dioxide Level


  27 MMOL/L


(21-32)


 


Anion Gap


  6 mmol/L


(5-15)


 


Blood Urea Nitrogen


  15 mg/dL


(7-18)


 


Creatinine


  1.0 MG/DL


(0.55-1.30)


 


Estimat Glomerular Filtration


Rate  mL/min (>60)  


 


 


Glucose Level


  106 MG/DL


()


 


Hemoglobin A1c


  5.9 %


(4.3-6.0)


 


Uric Acid


  4.3 MG/DL


(2.6-7.2)


 


Calcium Level


  8.1 MG/DL


(8.5-10.1)  L


 


Phosphorus Level


  3.1 MG/DL


(2.5-4.9)


 


Magnesium Level


  1.9 MG/DL


(1.8-2.4)


 


Total Bilirubin


  0.2 MG/DL


(0.2-1.0)


 


Gamma Glutamyl Transpeptidase 63 U/L (5-85)  


 


Aspartate Amino Transf


(AST/SGOT) 31 U/L (15-37)


 


 


Alanine Aminotransferase


(ALT/SGPT) 40 U/L (12-78)


 


 


Alkaline Phosphatase


  122 U/L


()  H


 


Total Creatine Kinase


  329 U/L


()  H


 


Pro-B-Type Natriuretic Peptide


  130 pg/mL


(0-125)  H


 


Total Protein


  6.1 G/DL


(6.4-8.2)  L


 


Albumin


  2.8 G/DL


(3.4-5.0)  L


 


Globulin 3.3 g/dL  


 


Albumin/Globulin Ratio


  0.8 (1.0-2.7)


L


 


Triglycerides Level


  50 MG/DL


()


 


Cholesterol Level


  111 MG/DL (<


200)


 


LDL Cholesterol


  45 mg/dL


(<100)


 


HDL Cholesterol


  51 MG/DL


(40-60)


 


Cholesterol/HDL Ratio


  2.2 (3.3-4.4)


L


 


Vitamin B12 Level


  1118 PG/ML


(193-986)  H


 


Thyroid Stimulating Hormone


(TSH) 2.377 uiU/mL


(0.358-3.740)











Current Medications








 Medications


  (Trade)  Dose


 Ordered  Sig/Melania


 Route


 PRN Reason  Start Time


 Stop Time Status Last Admin


Dose Admin


 


 Albuterol/


 Ipratropium


  (Albuterol/


 Ipratropium)  3 ml  Q4H  PRN


 HHN


 Shortness of Breath  9/30/18 08:00


 10/5/18 07:59   


 


 


 Atenolol


  (Tenormin)  25 mg  DAILY


 ORAL


   9/28/18 09:00


 10/28/18 08:59  9/30/18 08:30


 


 


 Bisacodyl


  (Dulcolax)  5 mg  DAILY@2100


 ORAL


   9/29/18 21:00


 10/29/18 20:59  9/29/18 20:29


 


 


 Clonidine HCl


  (Catapres Tab)  0.1 mg  EVERY 8  HOURS


 ORAL


   9/30/18 14:00


 10/27/18 17:59  10/1/18 05:43


 


 


 Clopidogrel


 Bisulfate


  (Plavix)  75 mg  DAILY


 ORAL


   9/28/18 09:00


 10/28/18 08:59  10/1/18 08:47


 


 


 Cyclobenzaprine


 HCl


  (Flexeril)  10 mg  TIDPRN  PRN


 ORAL


 Muscle Spasm  9/28/18 15:00


 10/28/18 14:59   


 


 


 Dextrose


  (Dextrose 50%)  25 ml  Q30M  PRN


 IV


 Hypoglycemia  9/27/18 12:00


 10/27/18 11:59   


 


 


 Dextrose


  (Dextrose 50%)  50 ml  Q30M  PRN


 IV


 Hypoglycemia  9/27/18 12:15


 10/27/18 12:14   


 


 


 Dextrose/Sodium


 Chloride  1,000 ml @ 


 50 mls/hr  Q20H


 IV


   10/1/18 12:00


 10/27/18 11:59  10/1/18 12:38


 


 


 Diazepam


  (Valium)  10 mg  HSPRN  PRN


 ORAL


 Insomnia  9/28/18 15:45


 10/5/18 15:44  10/1/18 01:40


 


 


 Diphenhydramine


 HCl


  (Benadryl)  25 mg  Q6H  PRN


 ORAL


 Itching/Pruritis  9/27/18 12:00


 10/27/18 11:59   


 


 


 Docusate Sodium


  (Colace)  100 mg  TWICE A  DAY


 ORAL


   9/29/18 09:00


 10/29/18 08:59  10/1/18 08:48


 


 


 Heparin Sodium


  (Porcine)


  (Heparin 5000


 units/ml)  5,000 units  EVERY 12  HOURS


 SUBQ


   9/27/18 21:00


 10/27/18 20:59  10/1/18 08:53


 


 


 Hydromorphone HCl


  (Dilaudid)  3 mg  Q3H  PRN


 IVP


 severe pain  9/28/18 14:15


 10/4/18 14:14  10/1/18 12:38


 


 


 Lorazepam


  (Ativan 2mg/ml


 1ml)  1 mg  Q4H  PRN


 IV


 agitation  9/27/18 12:00


 10/4/18 11:59   


 


 


 Metoclopramide HCl


  (Reglan)  10 mg  Q6H  PRN


 IVP


 severe nausea  9/27/18 12:00


 10/27/18 11:59   


 


 


 Nitroglycerin


  (Ntg)  0.4 mg  Q5M X 3 DOSES PRN


 SL


 Prn Chest Pain  9/27/18 12:00


 10/27/18 11:59   


 


 


 Pantoprazole


  (Protonix)  40 mg  DAILY


 ORAL


   9/29/18 09:00


 10/29/18 08:59  10/1/18 08:48


 


 


 Patient Own


 Medication


  (Patient's Own


 Med)  1 ea  DAILY


 ORAL


   9/28/18 09:00


 10/28/18 08:59  10/1/18 08:48


 


 


 Patient Own


 Medication


  (Patient's Own


 Med)  1 ea  DAILY


 ORAL


   9/28/18 09:00


 10/28/18 08:59  10/1/18 08:48


 


 


 Polyethylene


 Glycol


  (Miralax)  17 gm  HSPRN  PRN


 ORAL


 Constipation  9/27/18 12:39


 10/27/18 12:38  9/29/18 09:26


 


 


 Pregabalin


  (Lyrica)  25 mg  THREE TIMES A  DAY


 ORAL


   9/28/18 18:00


 10/28/18 17:59  10/1/18 08:47


 


 


 Promethazine HCl


  (Phenergan)  25 mg  Q6H  PRN


 IM


 REFRACTORY N/V  9/27/18 12:48


 10/27/18 12:47   


 

















Kayleen Chew M.D. Oct 1, 2018 13:23

## 2018-10-01 NOTE — GENERAL PROGRESS NOTE
Assessment/Plan


Status:  stable


Assessment/Plan


mdd


anxiety 





valium 10mg prm


provided ro/st





Subjective


Date patient seen:  Oct 1, 2018


Neurologic/Psychiatric:  Reports: anxiety, depressed, emotional problems


Allergies:  


Coded Allergies:  


     ACETAMINOPHEN (Verified  Allergy, Unknown, 3/24/10)


     ASPIRIN (Verified  Allergy, Unknown, 12/7/09)


     HYDROCODONE (Verified  Allergy, Unknown, 3/24/10)


     MORPHINE (Verified  Allergy, Unknown, 12/7/09)





Objective





Last 24 Hour Vital Signs








  Date Time  Temp Pulse Resp B/P (MAP) Pulse Ox O2 Delivery O2 Flow Rate FiO2


 


10/1/18 13:08 98.0       


 


10/1/18 12:38 98.0       


 


10/1/18 11:49  82 20   Room Air  


 


10/1/18 11:47 98.0 69 20 121/72 (88) 98   





 98.0       


 


10/1/18 09:17 98.1       


 


10/1/18 08:53  66  119/69    


 


10/1/18 08:48 98.1       


 


10/1/18 08:47 98.1       


 


10/1/18 08:15      Room Air  


 


10/1/18 08:00 98.9 66 20 119/69 (86) 99   





 98.9       


 


10/1/18 05:43    123/71    


 


10/1/18 04:00 98.1 63 19 123/71 (88) 100   





 98.1       


 


10/1/18 00:00 98.4 61 20 128/73 (91) 100   





 98.4       


 


9/30/18 22:00    124/69    


 


9/30/18 21:00      Room Air  


 


9/30/18 20:18  88 20   Room Air  


 


9/30/18 20:00 98.2 58 19 124/69 (87) 98   





 98.2       


 


9/30/18 16:00 98.1 91 18 132/74 (93) 92   





 98.1       

















Intake and Output  


 


 9/30/18 10/1/18





 19:00 07:00


 


Intake Total 2000 ml 


 


Balance 2000 ml 


 


  


 


Intake Oral 1600 ml 


 


IV Total 400 ml 


 


# Voids 4 3


 


# Bowel Movements 2 2








Laboratory Tests


10/1/18 05:00: 


White Blood Count 4.0L, Red Blood Count 3.78L, Hemoglobin 13.2L, Hematocrit 

37.9L, Mean Corpuscular Volume 100H, Mean Corpuscular Hemoglobin 34.8H, Mean 

Corpuscular Hemoglobin Concent 34.7, Red Cell Distribution Width 11.5L, 

Platelet Count 172, Mean Platelet Volume 5.5L, Neutrophils (%) (Auto) 49.4, 

Lymphocytes (%) (Auto) 36.3, Monocytes (%) (Auto) 9.1, Eosinophils (%) (Auto) 

4.0H, Basophils (%) (Auto) 1.1, Sodium Level 141, Potassium Level 3.5, Chloride 

Level 108H, Carbon Dioxide Level 27, Anion Gap 6, Blood Urea Nitrogen 15, 

Creatinine 1.0, Estimat Glomerular Filtration Rate , Glucose Level 106, 

Hemoglobin A1c 5.9, Uric Acid 4.3, Calcium Level 8.1L, Phosphorus Level 3.1, 

Magnesium Level 1.9, Total Bilirubin 0.2, Gamma Glutamyl Transpeptidase 63, 

Aspartate Amino Transf (AST/SGOT) 31, Alanine Aminotransferase (ALT/SGPT) 40, 

Alkaline Phosphatase 122H, Total Creatine Kinase 329H, Pro-B-Type Natriuretic 

Peptide 130H, Total Protein 6.1L, Albumin 2.8L, Globulin 3.3, Albumin/Globulin 

Ratio 0.8L, Triglycerides Level 50, Cholesterol Level 111, LDL Cholesterol 45, 

HDL Cholesterol 51, Cholesterol/HDL Ratio 2.2L, Vitamin B12 Level 1118H, 

Thyroid Stimulating Hormone (TSH) 2.377


Height (Feet):  5


Height (Inches):  11.00


Weight (Pounds):  173


General Appearance:  no apparent distress, alert











Ashwini Wynn MD Oct 1, 2018 13:48

## 2018-10-01 NOTE — NEPHROLOGY PROGRESS NOTE
Assessment/Plan


Problem List:  


(1) Acute renal failure


(2) Dehydration


(3) Hypertension


(4) HIV disease


(5) Intractable vomiting


Assessment


- Acute renal failure, improved. Cr 1.7 down to 1.0 .....+ Proteinuria


   partly dehydration





abdominal pain with intractable vomiting 


constipation diverticulosis 


hypertension  


COPD


HIV status on anti viral therapy


chronic subdural hematoma 


intractable back pain 


failed back surgery syndrome


Plan


Plan:





lower iv rate for Hydrate-


Urine studies-


avoid nephrotoxics


Monitor renal parameters-


continue per consultants-


Pain management





Subjective


ROS Limited/Unobtainable:  No


Constitutional:  Reports: malaise





Objective


Objective





Last 24 Hour Vital Signs








  Date Time  Temp Pulse Resp B/P (MAP) Pulse Ox O2 Delivery O2 Flow Rate FiO2


 


10/1/18 09:18 98.1       


 


10/1/18 09:17 98.1       


 


10/1/18 08:53  66  119/69    


 


10/1/18 08:48 98.1       


 


10/1/18 08:47 98.1       


 


10/1/18 08:15      Room Air  


 


10/1/18 08:00 98.9 66 20 119/69 (86) 99   





 98.9       


 


10/1/18 05:43    123/71    


 


10/1/18 04:00 98.1 63 19 123/71 (88) 100   





 98.1       


 


10/1/18 00:00 98.4 61 20 128/73 (91) 100   





 98.4       


 


9/30/18 22:00    124/69    


 


9/30/18 21:00      Room Air  


 


9/30/18 20:18  88 20   Room Air  


 


9/30/18 20:00 98.2 58 19 124/69 (87) 98   





 98.2       


 


9/30/18 16:00 98.1 91 18 132/74 (93) 92   





 98.1       


 


9/30/18 13:14    101/68    


 


9/30/18 12:00 98.3 52 18 101/68 (79) 98   





 98.3       

















Intake and Output  


 


 9/30/18 10/1/18





 19:00 07:00


 


Intake Total 2000 ml 


 


Balance 2000 ml 


 


  


 


Intake Oral 1600 ml 


 


IV Total 400 ml 


 


# Voids 4 3


 


# Bowel Movements 2 2








Laboratory Tests


10/1/18 05:00: 


White Blood Count 4.0L, Red Blood Count 3.78L, Hemoglobin 13.2L, Hematocrit 

37.9L, Mean Corpuscular Volume 100H, Mean Corpuscular Hemoglobin 34.8H, Mean 

Corpuscular Hemoglobin Concent 34.7, Red Cell Distribution Width 11.5L, 

Platelet Count 172, Mean Platelet Volume 5.5L, Neutrophils (%) (Auto) 49.4, 

Lymphocytes (%) (Auto) 36.3, Monocytes (%) (Auto) 9.1, Eosinophils (%) (Auto) 

4.0H, Basophils (%) (Auto) 1.1, Sodium Level 141, Potassium Level 3.5, Chloride 

Level 108H, Carbon Dioxide Level 27, Anion Gap 6, Blood Urea Nitrogen 15, 

Creatinine 1.0, Estimat Glomerular Filtration Rate , Glucose Level 106, 

Hemoglobin A1c 5.9, Uric Acid 4.3, Calcium Level 8.1L, Phosphorus Level 3.1, 

Magnesium Level 1.9, Total Bilirubin 0.2, Gamma Glutamyl Transpeptidase 63, 

Aspartate Amino Transf (AST/SGOT) 31, Alanine Aminotransferase (ALT/SGPT) 40, 

Alkaline Phosphatase 122H, Total Creatine Kinase 329H, Pro-B-Type Natriuretic 

Peptide 130H, Total Protein 6.1L, Albumin 2.8L, Globulin 3.3, Albumin/Globulin 

Ratio 0.8L, Triglycerides Level 50, Cholesterol Level 111, LDL Cholesterol 45, 

HDL Cholesterol 51, Cholesterol/HDL Ratio 2.2L, Vitamin B12 Level 1118H, 

Thyroid Stimulating Hormone (TSH) 2.377


Height (Feet):  5


Height (Inches):  11.00


Weight (Pounds):  173


General Appearance:  no apparent distress


Objective


no change











Osman Trejo MD Oct 1, 2018 11:52

## 2018-10-01 NOTE — PULMONOLOGY PROGRESS NOTE
Assessment/Plan


Problems:  


(1) Intractable back pain


(2) Intractable vomiting


(3) Abdominal pain of unknown etiology


(4) Hypertension


(5) HIV disease


(6) Chronic subdural hematoma


Assessment/Plan


doing better


pain much less





symptomatic treatment


GI evaluation appreicated


L spine


pain management





pt wants to go home in am





Subjective


ROS Limited/Unobtainable:  No


Constitutional:  Reports: no symptoms


HEENT:  Repors: no symptoms


Cardiovascular:  Reports: no symptoms


Gastrointestinal/Abdominal:  Reports: no symptoms


Allergies:  


Coded Allergies:  


     ACETAMINOPHEN (Verified  Allergy, Unknown, 3/24/10)


     ASPIRIN (Verified  Allergy, Unknown, 12/7/09)


     HYDROCODONE (Verified  Allergy, Unknown, 3/24/10)


     MORPHINE (Verified  Allergy, Unknown, 12/7/09)





Objective





Last 24 Hour Vital Signs








  Date Time  Temp Pulse Resp B/P (MAP) Pulse Ox O2 Delivery O2 Flow Rate FiO2


 


10/1/18 12:38 98.0       


 


10/1/18 11:49  82 20   Room Air  


 


10/1/18 11:47 98.0 69 20 121/72 (88) 98   





 98.0       


 


10/1/18 09:18 98.1       


 


10/1/18 09:17 98.1       


 


10/1/18 08:53  66  119/69    


 


10/1/18 08:48 98.1       


 


10/1/18 08:47 98.1       


 


10/1/18 08:15      Room Air  


 


10/1/18 08:00 98.9 66 20 119/69 (86) 99   





 98.9       


 


10/1/18 05:43    123/71    


 


10/1/18 04:00 98.1 63 19 123/71 (88) 100   





 98.1       


 


10/1/18 00:00 98.4 61 20 128/73 (91) 100   





 98.4       


 


9/30/18 22:00    124/69    


 


9/30/18 21:00      Room Air  


 


9/30/18 20:18  88 20   Room Air  


 


9/30/18 20:00 98.2 58 19 124/69 (87) 98   





 98.2       


 


9/30/18 16:00 98.1 91 18 132/74 (93) 92   





 98.1       

















Intake and Output  


 


 9/30/18 10/1/18





 19:00 07:00


 


Intake Total 2000 ml 


 


Balance 2000 ml 


 


  


 


Intake Oral 1600 ml 


 


IV Total 400 ml 


 


# Voids 4 3


 


# Bowel Movements 2 2








General Appearance:  WD/WN


HEENT:  normocephalic, anicteric


Respiratory/Chest:  chest wall non-tender, lungs clear


Cardiovascular:  normal peripheral pulses, normal rate


Abdomen:  normal bowel sounds, soft, non tender


Genitourinary:  normal external genitalia


Extremities:  no clubbing


Neurologic/Psychiatric:  CNs II-XII grossly normal


Laboratory Tests


10/1/18 05:00: 


White Blood Count 4.0L, Red Blood Count 3.78L, Hemoglobin 13.2L, Hematocrit 

37.9L, Mean Corpuscular Volume 100H, Mean Corpuscular Hemoglobin 34.8H, Mean 

Corpuscular Hemoglobin Concent 34.7, Red Cell Distribution Width 11.5L, 

Platelet Count 172, Mean Platelet Volume 5.5L, Neutrophils (%) (Auto) 49.4, 

Lymphocytes (%) (Auto) 36.3, Monocytes (%) (Auto) 9.1, Eosinophils (%) (Auto) 

4.0H, Basophils (%) (Auto) 1.1, Sodium Level 141, Potassium Level 3.5, Chloride 

Level 108H, Carbon Dioxide Level 27, Anion Gap 6, Blood Urea Nitrogen 15, 

Creatinine 1.0, Estimat Glomerular Filtration Rate , Glucose Level 106, 

Hemoglobin A1c 5.9, Uric Acid 4.3, Calcium Level 8.1L, Phosphorus Level 3.1, 

Magnesium Level 1.9, Total Bilirubin 0.2, Gamma Glutamyl Transpeptidase 63, 

Aspartate Amino Transf (AST/SGOT) 31, Alanine Aminotransferase (ALT/SGPT) 40, 

Alkaline Phosphatase 122H, Total Creatine Kinase 329H, Pro-B-Type Natriuretic 

Peptide 130H, Total Protein 6.1L, Albumin 2.8L, Globulin 3.3, Albumin/Globulin 

Ratio 0.8L, Triglycerides Level 50, Cholesterol Level 111, LDL Cholesterol 45, 

HDL Cholesterol 51, Cholesterol/HDL Ratio 2.2L, Vitamin B12 Level 1118H, 

Thyroid Stimulating Hormone (TSH) 2.377





Current Medications








 Medications


  (Trade)  Dose


 Ordered  Sig/Melania


 Route


 PRN Reason  Start Time


 Stop Time Status Last Admin


Dose Admin


 


 Albuterol/


 Ipratropium


  (Albuterol/


 Ipratropium)  3 ml  Q4H  PRN


 HHN


 Shortness of Breath  9/30/18 08:00


 10/5/18 07:59   


 


 


 Atenolol


  (Tenormin)  25 mg  DAILY


 ORAL


   9/28/18 09:00


 10/28/18 08:59  9/30/18 08:30


 


 


 Bisacodyl


  (Dulcolax)  5 mg  DAILY@2100


 ORAL


   9/29/18 21:00


 10/29/18 20:59  9/29/18 20:29


 


 


 Clonidine HCl


  (Catapres Tab)  0.1 mg  EVERY 8  HOURS


 ORAL


   9/30/18 14:00


 10/27/18 17:59  10/1/18 05:43


 


 


 Clopidogrel


 Bisulfate


  (Plavix)  75 mg  DAILY


 ORAL


   9/28/18 09:00


 10/28/18 08:59  10/1/18 08:47


 


 


 Cyclobenzaprine


 HCl


  (Flexeril)  10 mg  TIDPRN  PRN


 ORAL


 Muscle Spasm  9/28/18 15:00


 10/28/18 14:59   


 


 


 Dextrose


  (Dextrose 50%)  25 ml  Q30M  PRN


 IV


 Hypoglycemia  9/27/18 12:00


 10/27/18 11:59   


 


 


 Dextrose


  (Dextrose 50%)  50 ml  Q30M  PRN


 IV


 Hypoglycemia  9/27/18 12:15


 10/27/18 12:14   


 


 


 Dextrose/Sodium


 Chloride  1,000 ml @ 


 50 mls/hr  Q20H


 IV


   10/1/18 12:00


 10/27/18 11:59  10/1/18 12:38


 


 


 Diazepam


  (Valium)  10 mg  HSPRN  PRN


 ORAL


 Insomnia  9/28/18 15:45


 10/5/18 15:44  10/1/18 01:40


 


 


 Diphenhydramine


 HCl


  (Benadryl)  25 mg  Q6H  PRN


 ORAL


 Itching/Pruritis  9/27/18 12:00


 10/27/18 11:59   


 


 


 Docusate Sodium


  (Colace)  100 mg  TWICE A  DAY


 ORAL


   9/29/18 09:00


 10/29/18 08:59  10/1/18 08:48


 


 


 Heparin Sodium


  (Porcine)


  (Heparin 5000


 units/ml)  5,000 units  EVERY 12  HOURS


 SUBQ


   9/27/18 21:00


 10/27/18 20:59  10/1/18 08:53


 


 


 Hydromorphone HCl


  (Dilaudid)  3 mg  Q3H  PRN


 IVP


 severe pain  9/28/18 14:15


 10/4/18 14:14  10/1/18 12:38


 


 


 Lorazepam


  (Ativan 2mg/ml


 1ml)  1 mg  Q4H  PRN


 IV


 agitation  9/27/18 12:00


 10/4/18 11:59   


 


 


 Metoclopramide HCl


  (Reglan)  10 mg  Q6H  PRN


 IVP


 severe nausea  9/27/18 12:00


 10/27/18 11:59   


 


 


 Nitroglycerin


  (Ntg)  0.4 mg  Q5M X 3 DOSES PRN


 SL


 Prn Chest Pain  9/27/18 12:00


 10/27/18 11:59   


 


 


 Pantoprazole


  (Protonix)  40 mg  DAILY


 ORAL


   9/29/18 09:00


 10/29/18 08:59  10/1/18 08:48


 


 


 Patient Own


 Medication


  (Patient's Own


 Med)  1 ea  DAILY


 ORAL


   9/28/18 09:00


 10/28/18 08:59  10/1/18 08:48


 


 


 Patient Own


 Medication


  (Patient's Own


 Med)  1 ea  DAILY


 ORAL


   9/28/18 09:00


 10/28/18 08:59  10/1/18 08:48


 


 


 Polyethylene


 Glycol


  (Miralax)  17 gm  HSPRN  PRN


 ORAL


 Constipation  9/27/18 12:39


 10/27/18 12:38  9/29/18 09:26


 


 


 Pregabalin


  (Lyrica)  25 mg  THREE TIMES A  DAY


 ORAL


   9/28/18 18:00


 10/28/18 17:59  10/1/18 08:47


 


 


 Promethazine HCl


  (Phenergan)  25 mg  Q6H  PRN


 IM


 REFRACTORY N/V  9/27/18 12:48


 10/27/18 12:47   


 

















Adriana Arreola MD Oct 1, 2018 13:18

## 2018-10-01 NOTE — GI PROGRESS NOTE
Assessment/Plan


Problems:  


(1) Abdominal pain of unknown etiology


ICD Codes:  R10.9 - Unspecified abdominal pain


SNOMED:  956999490


(2) Injury, spinal cord


SNOMED:  60498566


(3) Intractable vomiting


ICD Codes:  R11.10 - Vomiting, unspecified


SNOMED:  768603961


(4) Intractable back pain


ICD Codes:  M54.9 - Intractable back pain


SNOMED:  942425055


Status:  stable


Status Narrative


Discussed with Dr. Ramirez.


Assessment/Plan


hx of spinal surgery


symptomatic treatment at this time


GI cocktail prn


zofran prn


regular diet


ppi


pain mgmt


bowel regime


patient is on plavix


fu labs





The patient was seen and examined at bedside and all new and available data was 

reviewed in the patients chart. I agree with the above findings, impression 

and plan.  (Patient seen earlier today. Signature stamp does not reflect 

patient encounter time.). - Mu Ramirez MD





Subjective


Subjective


abdominal pain has improved


hx of spinal surgery





Objective





Last 24 Hour Vital Signs








  Date Time  Temp Pulse Resp B/P (MAP) Pulse Ox O2 Delivery O2 Flow Rate FiO2


 


10/1/18 09:18 98.1       


 


10/1/18 09:17 98.1       


 


10/1/18 08:53  66  119/69    


 


10/1/18 08:48 98.1       


 


10/1/18 08:47 98.1       


 


10/1/18 05:43    123/71    


 


10/1/18 04:00 98.1 63 19 123/71 (88) 100   





 98.1       


 


10/1/18 00:00 98.4 61 20 128/73 (91) 100   





 98.4       


 


9/30/18 22:00    124/69    


 


9/30/18 21:00      Room Air  


 


9/30/18 20:18  88 20   Room Air  


 


9/30/18 20:00 98.2 58 19 124/69 (87) 98   





 98.2       


 


9/30/18 16:00 98.1 91 18 132/74 (93) 92   





 98.1       


 


9/30/18 13:14    101/68    


 


9/30/18 12:00 98.3 52 18 101/68 (79) 98   





 98.3       

















Intake and Output  


 


 9/30/18 10/1/18





 19:00 07:00


 


Intake Total 2000 ml 


 


Balance 2000 ml 


 


  


 


Intake Oral 1600 ml 


 


IV Total 400 ml 


 


# Voids 4 3


 


# Bowel Movements 2 2











Laboratory Tests








Test


  10/1/18


05:00


 


White Blood Count


  4.0 K/UL


(4.8-10.8)  L


 


Red Blood Count


  3.78 M/UL


(4.70-6.10)  L


 


Hemoglobin


  13.2 G/DL


(14.2-18.0)  L


 


Hematocrit


  37.9 %


(42.0-52.0)  L


 


Mean Corpuscular Volume


  100 FL (80-99)


H


 


Mean Corpuscular Hemoglobin


  34.8 PG


(27.0-31.0)  H


 


Mean Corpuscular Hemoglobin


Concent 34.7 G/DL


(32.0-36.0)


 


Red Cell Distribution Width


  11.5 %


(11.6-14.8)  L


 


Platelet Count


  172 K/UL


(150-450)


 


Mean Platelet Volume


  5.5 FL


(6.5-10.1)  L


 


Neutrophils (%) (Auto)


  49.4 %


(45.0-75.0)


 


Lymphocytes (%) (Auto)


  36.3 %


(20.0-45.0)


 


Monocytes (%) (Auto)


  9.1 %


(1.0-10.0)


 


Eosinophils (%) (Auto)


  4.0 %


(0.0-3.0)  H


 


Basophils (%) (Auto)


  1.1 %


(0.0-2.0)


 


Sodium Level


  141 MMOL/L


(136-145)


 


Potassium Level


  3.5 MMOL/L


(3.5-5.1)


 


Chloride Level


  108 MMOL/L


()  H


 


Carbon Dioxide Level


  27 MMOL/L


(21-32)


 


Anion Gap


  6 mmol/L


(5-15)


 


Blood Urea Nitrogen


  15 mg/dL


(7-18)


 


Creatinine


  1.0 MG/DL


(0.55-1.30)


 


Estimat Glomerular Filtration


Rate  mL/min (>60)  


 


 


Glucose Level


  106 MG/DL


()


 


Hemoglobin A1c


  5.9 %


(4.3-6.0)


 


Uric Acid


  4.3 MG/DL


(2.6-7.2)


 


Calcium Level


  8.1 MG/DL


(8.5-10.1)  L


 


Phosphorus Level


  3.1 MG/DL


(2.5-4.9)


 


Magnesium Level


  1.9 MG/DL


(1.8-2.4)


 


Total Bilirubin


  0.2 MG/DL


(0.2-1.0)


 


Gamma Glutamyl Transpeptidase 63 U/L (5-85)  


 


Aspartate Amino Transf


(AST/SGOT) 31 U/L (15-37)


 


 


Alanine Aminotransferase


(ALT/SGPT) 40 U/L (12-78)


 


 


Alkaline Phosphatase


  122 U/L


()  H


 


Total Creatine Kinase


  329 U/L


()  H


 


Pro-B-Type Natriuretic Peptide


  130 pg/mL


(0-125)  H


 


Total Protein


  6.1 G/DL


(6.4-8.2)  L


 


Albumin


  2.8 G/DL


(3.4-5.0)  L


 


Globulin 3.3 g/dL  


 


Albumin/Globulin Ratio


  0.8 (1.0-2.7)


L


 


Triglycerides Level


  50 MG/DL


()


 


Cholesterol Level


  111 MG/DL (<


200)


 


LDL Cholesterol


  45 mg/dL


(<100)


 


HDL Cholesterol


  51 MG/DL


(40-60)


 


Cholesterol/HDL Ratio


  2.2 (3.3-4.4)


L


 


Vitamin B12 Level


  1118 PG/ML


(193-986)  H


 


Thyroid Stimulating Hormone


(TSH) 2.377 uiU/mL


(0.358-3.740)








Height (Feet):  5


Height (Inches):  11.00


Weight (Pounds):  173


General Appearance:  WD/WN, no apparent distress, alert


Cardiovascular:  normal rate


Respiratory/Chest:  normal breath sounds, no respiratory distress


Abdominal Exam:  normal bowel sounds, non tender, soft


Extremities:  normal range of motion - decreased, non-tender











Jorge Jeff NP Oct 1, 2018 10:42

## 2018-10-02 VITALS — DIASTOLIC BLOOD PRESSURE: 81 MMHG | SYSTOLIC BLOOD PRESSURE: 127 MMHG

## 2018-10-02 VITALS — DIASTOLIC BLOOD PRESSURE: 77 MMHG | SYSTOLIC BLOOD PRESSURE: 117 MMHG

## 2018-10-02 VITALS — DIASTOLIC BLOOD PRESSURE: 87 MMHG | SYSTOLIC BLOOD PRESSURE: 143 MMHG

## 2018-10-02 LAB
ADD MANUAL DIFF: NO
ANION GAP SERPL CALC-SCNC: 5 MMOL/L (ref 5–15)
BASOPHILS NFR BLD AUTO: 1 % (ref 0–2)
BUN SERPL-MCNC: 11 MG/DL (ref 7–18)
CALCIUM SERPL-MCNC: 8.3 MG/DL (ref 8.5–10.1)
CHLORIDE SERPL-SCNC: 105 MMOL/L (ref 98–107)
CO2 SERPL-SCNC: 29 MMOL/L (ref 21–32)
CREAT SERPL-MCNC: 1 MG/DL (ref 0.55–1.3)
EOSINOPHIL NFR BLD AUTO: 5.6 % (ref 0–3)
ERYTHROCYTE [DISTWIDTH] IN BLOOD BY AUTOMATED COUNT: 11.5 % (ref 11.6–14.8)
HCT VFR BLD CALC: 38.2 % (ref 42–52)
HGB BLD-MCNC: 12.8 G/DL (ref 14.2–18)
LYMPHOCYTES NFR BLD AUTO: 36.5 % (ref 20–45)
MCV RBC AUTO: 101 FL (ref 80–99)
MONOCYTES NFR BLD AUTO: 8.5 % (ref 1–10)
NEUTROPHILS NFR BLD AUTO: 48.4 % (ref 45–75)
PLATELET # BLD: 179 K/UL (ref 150–450)
POTASSIUM SERPL-SCNC: 3.8 MMOL/L (ref 3.5–5.1)
RBC # BLD AUTO: 3.77 M/UL (ref 4.7–6.1)
SODIUM SERPL-SCNC: 139 MMOL/L (ref 136–145)
WBC # BLD AUTO: 3.8 K/UL (ref 4.8–10.8)

## 2018-10-02 RX ADMIN — ATENOLOL SCH MG: 25 TABLET ORAL at 08:35

## 2018-10-02 RX ADMIN — HEPARIN SODIUM SCH UNITS: 5000 INJECTION INTRAVENOUS; SUBCUTANEOUS at 08:35

## 2018-10-02 RX ADMIN — DOCUSATE SODIUM SCH MG: 100 CAPSULE, LIQUID FILLED ORAL at 09:00

## 2018-10-02 RX ADMIN — VALSARTAN SCH EA: 160 TABLET ORAL at 08:33

## 2018-10-02 RX ADMIN — PREGABALIN SCH MG: 25 CAPSULE ORAL at 08:32

## 2018-10-02 RX ADMIN — PREGABALIN SCH MG: 25 CAPSULE ORAL at 12:53

## 2018-10-02 RX ADMIN — DEXTROSE AND SODIUM CHLORIDE SCH MLS/HR: 5; .45 INJECTION, SOLUTION INTRAVENOUS at 08:00

## 2018-10-02 NOTE — GI PROGRESS NOTE
Assessment/Plan


Problems:  


(1) Abdominal pain of unknown etiology


ICD Codes:  R10.9 - Unspecified abdominal pain


SNOMED:  342546632


(2) Injury, spinal cord


SNOMED:  39910249


(3) Intractable vomiting


ICD Codes:  R11.10 - Vomiting, unspecified


SNOMED:  087091780


(4) Intractable back pain


ICD Codes:  M54.9 - Intractable back pain


SNOMED:  196661086


Status:  stable


Status Narrative


Discussed with Dr. Ramirez.


Assessment/Plan


hx of spinal surgery


okay for DC per GI standpoint


symptomatic treatment


GI cocktail prn


zofran prn


regular diet


ppi


pain mgmt


bowel regime


patient is on plavix


fu labs





The patient was seen and examined at bedside and all new and available data was 

reviewed in the patients chart. I agree with the above findings, impression 

and plan.  (Patient seen earlier today. Signature stamp does not reflect 

patient encounter time.). - Mu Ramirez MD





Subjective


Subjective


abdominal pain has improved


hx of spinal surgery





Objective





Last 24 Hour Vital Signs








  Date Time  Temp Pulse Resp B/P (MAP) Pulse Ox O2 Delivery O2 Flow Rate FiO2


 


10/2/18 12:00 98.2 60 18 127/81 (96) 94   





 98.2       


 


10/2/18 09:00      Room Air  


 


10/2/18 08:45  65 17   Room Air  21


 


10/2/18 08:35  63  117/77    


 


10/2/18 08:10 98.1 63 18 117/77 (90) 98   





 98.1       


 


10/2/18 06:36    133/72    


 


10/2/18 05:02 98.9       


 


10/2/18 05:02 98.9       


 


10/2/18 04:39 98.9       


 


10/2/18 04:00 98.9 56 19 143/87 (105) 100   





 98.9       


 


10/1/18 22:51      Room Air  


 


10/1/18 22:38    140/78    


 


10/1/18 20:00 98.9 62 18 128/76 (93) 97   





 98.9       


 


10/1/18 19:22  78 18   Room Air  


 


10/1/18 18:35 98.0       


 


10/1/18 18:25 98.0       


 


10/1/18 17:55 98.0       


 


10/1/18 16:00 98.0 69 19 120/73 (89) 97   





 98.0       


 


10/1/18 13:51    121/72    


 


10/1/18 13:51 98.0       

















Intake and Output  


 


 10/1/18 10/2/18





 19:00 07:00


 


Intake Total 1850 ml 1400 ml


 


Balance 1850 ml 1400 ml


 


  


 


Intake Oral 1800 ml 800 ml


 


IV Total 50 ml 600 ml


 


# Voids 7 3


 


# Bowel Movements 1 











Laboratory Tests








Test


  10/2/18


06:40


 


White Blood Count


  3.8 K/UL


(4.8-10.8)  L


 


Red Blood Count


  3.77 M/UL


(4.70-6.10)  L


 


Hemoglobin


  12.8 G/DL


(14.2-18.0)  L


 


Hematocrit


  38.2 %


(42.0-52.0)  L


 


Mean Corpuscular Volume


  101 FL (80-99)


H


 


Mean Corpuscular Hemoglobin


  33.9 PG


(27.0-31.0)  H


 


Mean Corpuscular Hemoglobin


Concent 33.4 G/DL


(32.0-36.0)


 


Red Cell Distribution Width


  11.5 %


(11.6-14.8)  L


 


Platelet Count


  179 K/UL


(150-450)


 


Mean Platelet Volume


  5.6 FL


(6.5-10.1)  L


 


Neutrophils (%) (Auto)


  48.4 %


(45.0-75.0)


 


Lymphocytes (%) (Auto)


  36.5 %


(20.0-45.0)


 


Monocytes (%) (Auto)


  8.5 %


(1.0-10.0)


 


Eosinophils (%) (Auto)


  5.6 %


(0.0-3.0)  H


 


Basophils (%) (Auto)


  1.0 %


(0.0-2.0)


 


Sodium Level


  139 MMOL/L


(136-145)


 


Potassium Level


  3.8 MMOL/L


(3.5-5.1)


 


Chloride Level


  105 MMOL/L


()


 


Carbon Dioxide Level


  29 MMOL/L


(21-32)


 


Anion Gap


  5 mmol/L


(5-15)


 


Blood Urea Nitrogen


  11 mg/dL


(7-18)


 


Creatinine


  1.0 MG/DL


(0.55-1.30)


 


Estimat Glomerular Filtration


Rate  mL/min (>60)  


 


 


Glucose Level


  110 MG/DL


()  H


 


Calcium Level


  8.3 MG/DL


(8.5-10.1)  L








Height (Feet):  5


Height (Inches):  11.00


Weight (Pounds):  173


General Appearance:  WD/WN, no apparent distress, alert


Cardiovascular:  normal rate


Respiratory/Chest:  normal breath sounds, no respiratory distress


Abdominal Exam:  normal bowel sounds, non tender, soft


Extremities:  normal range of motion, non-tender











Jorge Jeff NP Oct 2, 2018 13:49

## 2018-10-02 NOTE — INFECTIOUS DISEASES PROG NOTE
Assessment/Plan


Assessment/Plan


74 yo male with





Abdominal pain and vomiting, SP


   - Likely gastroenteritis vs food poisoning 


   - US of abd : no sig findings


   - 9/27 CT abd/pel   Nonobstructive stones in the left kidney and question of 

a tiny nonobstructive stone in the right kidney. Mild diverticulosis of the 

colon Small right inguinal hernia containing fluid versus undescended 

testis.Lower lumbar interbody fusion.





 HIV - Controlled


  - On Truvada and Nevirapine








LBP: improving


 MRI: Degenerative changes at L2-3 and L3-4, resulting in mild spinal canal and 

neural foraminal compromise, unchanged from earlier study of 12/15/2016


        Postsurgical changes at L4-5 and L5-S1, as described. Note that 

susceptibility artifact from such could obscure pathology of this level. No 

evidence of neural compromise except for minimal neural foraminal stenosis at L5

-S1 bilaterally


HTN


COPD








PLAN:





- Continue home Truvada and Nevirapine


- Monitor abdominal symptoms


- Monitor Temps and CBC


- Supportive care





Subjective


Allergies:  


Coded Allergies:  


     ACETAMINOPHEN (Verified  Allergy, Unknown, 3/24/10)


     ASPIRIN (Verified  Allergy, Unknown, 12/7/09)


     HYDROCODONE (Verified  Allergy, Unknown, 3/24/10)


     MORPHINE (Verified  Allergy, Unknown, 12/7/09)


Subjective


afebrile


no leukocytosis


off abx


abd pain resolved


no further vomiting


back pain imrpoving





Objective


Vital Signs





Last 24 Hour Vital Signs








  Date Time  Temp Pulse Resp B/P (MAP) Pulse Ox O2 Delivery O2 Flow Rate FiO2


 


10/2/18 09:00      Room Air  


 


10/2/18 08:45  65 17   Room Air  21


 


10/2/18 08:35  63  117/77    


 


10/2/18 08:10 98.1 63 18 117/77 (90) 98   





 98.1       


 


10/2/18 06:36    133/72    


 


10/2/18 05:02 98.9       


 


10/2/18 05:02 98.9       


 


10/2/18 04:39 98.9       


 


10/2/18 04:00 98.9 56 19 143/87 (105) 100   





 98.9       


 


10/1/18 22:51      Room Air  


 


10/1/18 22:38    140/78    


 


10/1/18 20:00 98.9 62 18 128/76 (93) 97   





 98.9       


 


10/1/18 19:22  78 18   Room Air  


 


10/1/18 18:35 98.0       


 


10/1/18 18:25 98.0       


 


10/1/18 17:55 98.0       


 


10/1/18 16:00 98.0 69 19 120/73 (89) 97   





 98.0       


 


10/1/18 13:51    121/72    


 


10/1/18 13:51 98.0       


 


10/1/18 12:38 98.0       








Height (Feet):  5


Height (Inches):  11.00


Weight (Pounds):  173


Objective


General Appearance:  WD/WN


HEENT:  normocephalic, anicteric


Respiratory/Chest:  chest wall non-tender, lungs clear


Cardiovascular:  normal peripheral pulses, normal rate


Abdomen:  normal bowel sounds, soft, non tender


Genitourinary:  normal external genitalia


Extremities:  no clubbing





Laboratory Tests








Test


  10/2/18


06:40


 


White Blood Count


  3.8 K/UL


(4.8-10.8)  L


 


Red Blood Count


  3.77 M/UL


(4.70-6.10)  L


 


Hemoglobin


  12.8 G/DL


(14.2-18.0)  L


 


Hematocrit


  38.2 %


(42.0-52.0)  L


 


Mean Corpuscular Volume


  101 FL (80-99)


H


 


Mean Corpuscular Hemoglobin


  33.9 PG


(27.0-31.0)  H


 


Mean Corpuscular Hemoglobin


Concent 33.4 G/DL


(32.0-36.0)


 


Red Cell Distribution Width


  11.5 %


(11.6-14.8)  L


 


Platelet Count


  179 K/UL


(150-450)


 


Mean Platelet Volume


  5.6 FL


(6.5-10.1)  L


 


Neutrophils (%) (Auto)


  48.4 %


(45.0-75.0)


 


Lymphocytes (%) (Auto)


  36.5 %


(20.0-45.0)


 


Monocytes (%) (Auto)


  8.5 %


(1.0-10.0)


 


Eosinophils (%) (Auto)


  5.6 %


(0.0-3.0)  H


 


Basophils (%) (Auto)


  1.0 %


(0.0-2.0)


 


Sodium Level


  139 MMOL/L


(136-145)


 


Potassium Level


  3.8 MMOL/L


(3.5-5.1)


 


Chloride Level


  105 MMOL/L


()


 


Carbon Dioxide Level


  29 MMOL/L


(21-32)


 


Anion Gap


  5 mmol/L


(5-15)


 


Blood Urea Nitrogen


  11 mg/dL


(7-18)


 


Creatinine


  1.0 MG/DL


(0.55-1.30)


 


Estimat Glomerular Filtration


Rate  mL/min (>60)  


 


 


Glucose Level


  110 MG/DL


()  H


 


Calcium Level


  8.3 MG/DL


(8.5-10.1)  L











Current Medications








 Medications


  (Trade)  Dose


 Ordered  Sig/Melania


 Route


 PRN Reason  Start Time


 Stop Time Status Last Admin


Dose Admin


 


 Albuterol/


 Ipratropium


  (Albuterol/


 Ipratropium)  3 ml  Q4H  PRN


 HHN


 Shortness of Breath  9/30/18 08:00


 10/5/18 07:59   


 


 


 Atenolol


  (Tenormin)  25 mg  DAILY


 ORAL


   9/28/18 09:00


 10/28/18 08:59  9/30/18 08:30


 


 


 Bisacodyl


  (Dulcolax)  5 mg  DAILY@2100


 ORAL


   9/29/18 21:00


 10/29/18 20:59  9/29/18 20:29


 


 


 Clonidine HCl


  (Catapres Tab)  0.1 mg  EVERY 8  HOURS


 ORAL


   9/30/18 14:00


 10/27/18 17:59  10/2/18 06:36


 


 


 Clopidogrel


 Bisulfate


  (Plavix)  75 mg  DAILY


 ORAL


   9/28/18 09:00


 10/28/18 08:59  10/2/18 08:32


 


 


 Cyclobenzaprine


 HCl


  (Flexeril)  10 mg  TIDPRN  PRN


 ORAL


 Muscle Spasm  9/28/18 15:00


 10/28/18 14:59  10/2/18 04:39


 


 


 Dextrose


  (Dextrose 50%)  25 ml  Q30M  PRN


 IV


 Hypoglycemia  9/27/18 12:00


 10/27/18 11:59   


 


 


 Dextrose


  (Dextrose 50%)  50 ml  Q30M  PRN


 IV


 Hypoglycemia  9/27/18 12:15


 10/27/18 12:14   


 


 


 Dextrose/Sodium


 Chloride  1,000 ml @ 


 50 mls/hr  Q20H


 IV


   10/1/18 12:00


 10/27/18 11:59  10/1/18 12:38


 


 


 Diazepam


  (Valium)  10 mg  HSPRN  PRN


 ORAL


 Insomnia  9/28/18 15:45


 10/5/18 15:44  10/1/18 22:40


 


 


 Diphenhydramine


 HCl


  (Benadryl)  25 mg  Q6H  PRN


 ORAL


 Itching/Pruritis  9/27/18 12:00


 10/27/18 11:59   


 


 


 Docusate Sodium


  (Colace)  100 mg  TWICE A  DAY


 ORAL


   9/29/18 09:00


 10/29/18 08:59  10/1/18 08:48


 


 


 Heparin Sodium


  (Porcine)


  (Heparin 5000


 units/ml)  5,000 units  EVERY 12  HOURS


 SUBQ


   9/27/18 21:00


 10/27/18 20:59  10/2/18 08:35


 


 


 Hydromorphone HCl


  (Dilaudid)  3 mg  Q3H  PRN


 IVP


 severe pain  9/28/18 14:15


 10/4/18 14:14  10/2/18 11:34


 


 


 Lorazepam


  (Ativan 2mg/ml


 1ml)  1 mg  Q4H  PRN


 IV


 agitation  9/27/18 12:00


 10/4/18 11:59   


 


 


 Metoclopramide HCl


  (Reglan)  10 mg  Q6H  PRN


 IVP


 severe nausea  9/27/18 12:00


 10/27/18 11:59   


 


 


 Nitroglycerin


  (Ntg)  0.4 mg  Q5M X 3 DOSES PRN


 SL


 Prn Chest Pain  9/27/18 12:00


 10/27/18 11:59   


 


 


 Pantoprazole


  (Protonix)  40 mg  DAILY


 ORAL


   9/29/18 09:00


 10/29/18 08:59  10/2/18 08:31


 


 


 Patient Own


 Medication


  (Patient's Own


 Med)  1 ea  DAILY


 ORAL


   9/28/18 09:00


 10/28/18 08:59  10/2/18 08:33


 


 


 Patient Own


 Medication


  (Patient's Own


 Med)  1 ea  DAILY


 ORAL


   9/28/18 09:00


 10/28/18 08:59  10/2/18 08:33


 


 


 Polyethylene


 Glycol


  (Miralax)  17 gm  HSPRN  PRN


 ORAL


 Constipation  9/27/18 12:39


 10/27/18 12:38  9/29/18 09:26


 


 


 Pregabalin


  (Lyrica)  25 mg  THREE TIMES A  DAY


 ORAL


   9/28/18 18:00


 10/28/18 17:59  10/2/18 08:32


 


 


 Promethazine HCl


  (Phenergan)  25 mg  Q6H  PRN


 IM


 REFRACTORY N/V  9/27/18 12:48


 10/27/18 12:47   


 

















Kayleen Chew M.D. Oct 2, 2018 12:21

## 2018-10-02 NOTE — GENERAL PROGRESS NOTE
Assessment/Plan


Status:  stable


Assessment/Plan


mdd


anxiety 





valium 10mg prm


provided ro/st





Subjective


Date patient seen:  Oct 2, 2018


Neurologic/Psychiatric:  Reports: anxiety, depressed, emotional problems


Allergies:  


Coded Allergies:  


     ACETAMINOPHEN (Verified  Allergy, Unknown, 3/24/10)


     ASPIRIN (Verified  Allergy, Unknown, 12/7/09)


     HYDROCODONE (Verified  Allergy, Unknown, 3/24/10)


     MORPHINE (Verified  Allergy, Unknown, 12/7/09)


Subjective


the pt is asking for Valium,





Objective





Last 24 Hour Vital Signs








  Date Time  Temp Pulse Resp B/P (MAP) Pulse Ox O2 Delivery O2 Flow Rate FiO2


 


10/2/18 13:54    127/81    


 


10/2/18 12:00 98.2 60 18 127/81 (96) 94   





 98.2       


 


10/2/18 09:00      Room Air  


 


10/2/18 08:45  65 17   Room Air  21


 


10/2/18 08:35  63  117/77    


 


10/2/18 08:10 98.1 63 18 117/77 (90) 98   





 98.1       


 


10/2/18 06:36    133/72    


 


10/2/18 05:02 98.9       


 


10/2/18 05:02 98.9       


 


10/2/18 04:39 98.9       


 


10/2/18 04:00 98.9 56 19 143/87 (105) 100   





 98.9       


 


10/1/18 22:51      Room Air  


 


10/1/18 22:38    140/78    


 


10/1/18 20:00 98.9 62 18 128/76 (93) 97   





 98.9       


 


10/1/18 19:22  78 18   Room Air  


 


10/1/18 18:35 98.0       


 


10/1/18 18:25 98.0       


 


10/1/18 17:55 98.0       


 


10/1/18 16:00 98.0 69 19 120/73 (89) 97   





 98.0       

















Intake and Output  


 


 10/1/18 10/2/18





 19:00 07:00


 


Intake Total 1850 ml 1400 ml


 


Balance 1850 ml 1400 ml


 


  


 


Intake Oral 1800 ml 800 ml


 


IV Total 50 ml 600 ml


 


# Voids 7 3


 


# Bowel Movements 1 








Laboratory Tests


10/2/18 06:40: 


White Blood Count 3.8L, Red Blood Count 3.77L, Hemoglobin 12.8L, Hematocrit 

38.2L, Mean Corpuscular Volume 101H, Mean Corpuscular Hemoglobin 33.9H, Mean 

Corpuscular Hemoglobin Concent 33.4, Red Cell Distribution Width 11.5L, 

Platelet Count 179, Mean Platelet Volume 5.6L, Neutrophils (%) (Auto) 48.4, 

Lymphocytes (%) (Auto) 36.5, Monocytes (%) (Auto) 8.5, Eosinophils (%) (Auto) 

5.6H, Basophils (%) (Auto) 1.0, Sodium Level 139, Potassium Level 3.8, Chloride 

Level 105, Carbon Dioxide Level 29, Anion Gap 5, Blood Urea Nitrogen 11, 

Creatinine 1.0, Estimat Glomerular Filtration Rate , Glucose Level 110H, 

Calcium Level 8.3L


Height (Feet):  5


Height (Inches):  11.00


Weight (Pounds):  173


General Appearance:  no apparent distress, alert


Neurologic:  oriented x 3, responsive











Ashwini Wynn MD Oct 2, 2018 14:02

## 2018-10-02 NOTE — NEPHROLOGY PROGRESS NOTE
Assessment/Plan


Problem List:  


(1) Acute renal failure


(2) Dehydration


(3) Hypertension


(4) HIV disease


(5) Intractable vomiting


Assessment


- Acute renal failure, improved. Cr 1.7 down to 1.0 .....+ Proteinuria


   partly dehydration





abdominal pain with intractable vomiting 


constipation diverticulosis 


hypertension  


COPD


HIV status on anti viral therapy


chronic subdural hematoma 


intractable back pain 


failed back surgery syndrome


Plan


Plan:





lower iv rate for Hydrate-


Urine studies-


avoid nephrotoxics


Monitor renal parameters-


continue per consultants-


Pain management





Subjective


ROS Limited/Unobtainable:  No





Objective


Objective





Last 24 Hour Vital Signs








  Date Time  Temp Pulse Resp B/P (MAP) Pulse Ox O2 Delivery O2 Flow Rate FiO2


 


10/2/18 09:00      Room Air  


 


10/2/18 08:45  65 17   Room Air  21


 


10/2/18 08:35  63  117/77    


 


10/2/18 08:10 98.1 63 18 117/77 (90) 98   





 98.1       


 


10/2/18 06:36    133/72    


 


10/2/18 05:02 98.9       


 


10/2/18 05:02 98.9       


 


10/2/18 04:39 98.9       


 


10/2/18 04:00 98.9 56 19 143/87 (105) 100   





 98.9       


 


10/1/18 22:51      Room Air  


 


10/1/18 22:38    140/78    


 


10/1/18 20:00 98.9 62 18 128/76 (93) 97   





 98.9       


 


10/1/18 19:22  78 18   Room Air  


 


10/1/18 18:35 98.0       


 


10/1/18 18:25 98.0       


 


10/1/18 17:55 98.0       


 


10/1/18 16:00 98.0 69 19 120/73 (89) 97   





 98.0       


 


10/1/18 13:51    121/72    


 


10/1/18 13:51 98.0       


 


10/1/18 12:38 98.0       


 


10/1/18 11:49  82 20   Room Air  

















Intake and Output  


 


 10/1/18 10/2/18





 19:00 07:00


 


Intake Total 1850 ml 1400 ml


 


Balance 1850 ml 1400 ml


 


  


 


Intake Oral 1800 ml 800 ml


 


IV Total 50 ml 600 ml


 


# Voids 7 3


 


# Bowel Movements 1 








Laboratory Tests


10/2/18 06:40: 


White Blood Count 3.8L, Red Blood Count 3.77L, Hemoglobin 12.8L, Hematocrit 

38.2L, Mean Corpuscular Volume 101H, Mean Corpuscular Hemoglobin 33.9H, Mean 

Corpuscular Hemoglobin Concent 33.4, Red Cell Distribution Width 11.5L, 

Platelet Count 179, Mean Platelet Volume 5.6L, Neutrophils (%) (Auto) 48.4, 

Lymphocytes (%) (Auto) 36.5, Monocytes (%) (Auto) 8.5, Eosinophils (%) (Auto) 

5.6H, Basophils (%) (Auto) 1.0, Sodium Level 139, Potassium Level 3.8, Chloride 

Level 105, Carbon Dioxide Level 29, Anion Gap 5, Blood Urea Nitrogen 11, 

Creatinine 1.0, Estimat Glomerular Filtration Rate , Glucose Level 110H, 

Calcium Level 8.3L


Height (Feet):  5


Height (Inches):  11.00


Weight (Pounds):  173


General Appearance:  no apparent distress


Objective


no change











Osman Trejo MD Oct 2, 2018 11:48

## 2018-10-02 NOTE — PULMONOLOGY PROGRESS NOTE
Assessment/Plan


Problems:  


(1) Intractable back pain


(2) Intractable vomiting


(3) Abdominal pain of unknown etiology


(4) Hypertension


(5) HIV disease


(6) Chronic subdural hematoma


Assessment/Plan


doing better


pain much less


all reviewed


symptomatic treatment


GI evaluation appreicated


L spine


pain management





f/u as outpatient





Subjective


ROS Limited/Unobtainable:  No


Constitutional:  Reports: no symptoms


HEENT:  Repors: no symptoms


Respiratory:  Reports: no symptoms


Allergies:  


Coded Allergies:  


     ACETAMINOPHEN (Verified  Allergy, Unknown, 3/24/10)


     ASPIRIN (Verified  Allergy, Unknown, 12/7/09)


     HYDROCODONE (Verified  Allergy, Unknown, 3/24/10)


     MORPHINE (Verified  Allergy, Unknown, 12/7/09)





Objective





Last 24 Hour Vital Signs








  Date Time  Temp Pulse Resp B/P (MAP) Pulse Ox O2 Delivery O2 Flow Rate FiO2


 


10/2/18 13:54    127/81    


 


10/2/18 12:00 98.2 60 18 127/81 (96) 94   





 98.2       


 


10/2/18 09:00      Room Air  


 


10/2/18 08:45  65 17   Room Air  21


 


10/2/18 08:35  63  117/77    


 


10/2/18 08:10 98.1 63 18 117/77 (90) 98   





 98.1       


 


10/2/18 06:36    133/72    


 


10/2/18 05:02 98.9       


 


10/2/18 05:02 98.9       


 


10/2/18 04:39 98.9       


 


10/2/18 04:00 98.9 56 19 143/87 (105) 100   





 98.9       


 


10/1/18 22:51      Room Air  


 


10/1/18 22:38    140/78    


 


10/1/18 20:00 98.9 62 18 128/76 (93) 97   





 98.9       


 


10/1/18 19:22  78 18   Room Air  


 


10/1/18 18:35 98.0       


 


10/1/18 18:25 98.0       


 


10/1/18 17:55 98.0       


 


10/1/18 16:00 98.0 69 19 120/73 (89) 97   





 98.0       

















Intake and Output  


 


 10/1/18 10/2/18





 19:00 07:00


 


Intake Total 1850 ml 1400 ml


 


Balance 1850 ml 1400 ml


 


  


 


Intake Oral 1800 ml 800 ml


 


IV Total 50 ml 600 ml


 


# Voids 7 3


 


# Bowel Movements 1 








General Appearance:  WD/WN


HEENT:  atraumatic, mucous membranes moist


Respiratory/Chest:  lungs clear, no respiratory distress


Cardiovascular:  normal rate, regular rhythm


Abdomen:  normal bowel sounds, no organomegaly


Extremities:  no cyanosis


Skin:  no rash


Laboratory Tests


10/2/18 06:40: 


White Blood Count 3.8L, Red Blood Count 3.77L, Hemoglobin 12.8L, Hematocrit 

38.2L, Mean Corpuscular Volume 101H, Mean Corpuscular Hemoglobin 33.9H, Mean 

Corpuscular Hemoglobin Concent 33.4, Red Cell Distribution Width 11.5L, 

Platelet Count 179, Mean Platelet Volume 5.6L, Neutrophils (%) (Auto) 48.4, 

Lymphocytes (%) (Auto) 36.5, Monocytes (%) (Auto) 8.5, Eosinophils (%) (Auto) 

5.6H, Basophils (%) (Auto) 1.0, Sodium Level 139, Potassium Level 3.8, Chloride 

Level 105, Carbon Dioxide Level 29, Anion Gap 5, Blood Urea Nitrogen 11, 

Creatinine 1.0, Estimat Glomerular Filtration Rate , Glucose Level 110H, 

Calcium Level 8.3L





Current Medications








 Medications


  (Trade)  Dose


 Ordered  Sig/Melania


 Route


 PRN Reason  Start Time


 Stop Time Status Last Admin


Dose Admin


 


 Albuterol/


 Ipratropium


  (Albuterol/


 Ipratropium)  3 ml  Q4H  PRN


 HHN


 Shortness of Breath  9/30/18 08:00


 10/5/18 07:59   


 


 


 Atenolol


  (Tenormin)  25 mg  DAILY


 ORAL


   9/28/18 09:00


 10/28/18 08:59  9/30/18 08:30


 


 


 Bisacodyl


  (Dulcolax)  5 mg  DAILY@2100


 ORAL


   9/29/18 21:00


 10/29/18 20:59  9/29/18 20:29


 


 


 Clonidine HCl


  (Catapres Tab)  0.1 mg  EVERY 8  HOURS


 ORAL


   9/30/18 14:00


 10/27/18 17:59  10/2/18 06:36


 


 


 Clopidogrel


 Bisulfate


  (Plavix)  75 mg  DAILY


 ORAL


   9/28/18 09:00


 10/28/18 08:59  10/2/18 08:32


 


 


 Cyclobenzaprine


 HCl


  (Flexeril)  10 mg  TIDPRN  PRN


 ORAL


 Muscle Spasm  9/28/18 15:00


 10/28/18 14:59  10/2/18 04:39


 


 


 Dextrose


  (Dextrose 50%)  25 ml  Q30M  PRN


 IV


 Hypoglycemia  9/27/18 12:00


 10/27/18 11:59   


 


 


 Dextrose


  (Dextrose 50%)  50 ml  Q30M  PRN


 IV


 Hypoglycemia  9/27/18 12:15


 10/27/18 12:14   


 


 


 Dextrose/Sodium


 Chloride  1,000 ml @ 


 50 mls/hr  Q20H


 IV


   10/1/18 12:00


 10/27/18 11:59  10/1/18 12:38


 


 


 Diazepam


  (Valium)  10 mg  HSPRN  PRN


 ORAL


 Insomnia  9/28/18 15:45


 10/5/18 15:44  10/1/18 22:40


 


 


 Diphenhydramine


 HCl


  (Benadryl)  25 mg  Q6H  PRN


 ORAL


 Itching/Pruritis  9/27/18 12:00


 10/27/18 11:59   


 


 


 Docusate Sodium


  (Colace)  100 mg  TWICE A  DAY


 ORAL


   9/29/18 09:00


 10/29/18 08:59  10/1/18 08:48


 


 


 Heparin Sodium


  (Porcine)


  (Heparin 5000


 units/ml)  5,000 units  EVERY 12  HOURS


 SUBQ


   9/27/18 21:00


 10/27/18 20:59  10/2/18 08:35


 


 


 Lorazepam


  (Ativan 2mg/ml


 1ml)  1 mg  Q4H  PRN


 IV


 agitation  9/27/18 12:00


 10/4/18 11:59   


 


 


 Metoclopramide HCl


  (Reglan)  10 mg  Q6H  PRN


 IVP


 severe nausea  9/27/18 12:00


 10/27/18 11:59   


 


 


 Nitroglycerin


  (Ntg)  0.4 mg  Q5M X 3 DOSES PRN


 SL


 Prn Chest Pain  9/27/18 12:00


 10/27/18 11:59   


 


 


 Pantoprazole


  (Protonix)  40 mg  DAILY


 ORAL


   9/29/18 09:00


 10/29/18 08:59  10/2/18 08:31


 


 


 Patient Own


 Medication


  (Patient's Own


 Med)  1 ea  DAILY


 ORAL


   9/28/18 09:00


 10/28/18 08:59  10/2/18 08:33


 


 


 Patient Own


 Medication


  (Patient's Own


 Med)  1 ea  DAILY


 ORAL


   9/28/18 09:00


 10/28/18 08:59  10/2/18 08:33


 


 


 Polyethylene


 Glycol


  (Miralax)  17 gm  HSPRN  PRN


 ORAL


 Constipation  9/27/18 12:39


 10/27/18 12:38  9/29/18 09:26


 


 


 Pregabalin


  (Lyrica)  25 mg  THREE TIMES A  DAY


 ORAL


   9/28/18 18:00


 10/28/18 17:59  10/2/18 12:53


 


 


 Promethazine HCl


  (Phenergan)  25 mg  Q6H  PRN


 IM


 REFRACTORY N/V  9/27/18 12:48


 10/27/18 12:47   


 

















Adriana Arerola MD Oct 2, 2018 14:25

## 2018-10-03 NOTE — DISCHARGE SUMMARY
Discharge Summary


Discharge Summary


_


DATE OF ADMISSION: 09/27/2018





DATE OF DISCHARGE:10/02/ 2018





REASON FOR ADMISSION: 


73 years old male with   past medical history of hypertension, COPD, HIV, 

chronic subdural hematoma,lumbar fusion, presented to emergency department with

  chief complaint of generalized weakness , abdominal pain with intractable 

vomiting and decreased oral intake.  


He denied fever and chills.  


Patient denied chest pain or shortness of breath.


Patient  reported to be to weak to eat or walk.  


Upon evaluation vital signs reveal tachycardia with heart rate 115;  blood 

pressure elevated 199/109.  


Laboratory workup revealed leukocytosis with WBC 11.9, stable hemoglobin and 

hematocrit.  


Sodium 132.  Otherwise stable renal parameters and electrolytes.


Lipase  and LFT  stable.


Urinalysis showed no evidence of UTI.  


CT of the abdomen and pelvis revealed diverticulosis of colon without evidence 

of diverticulitis.  


Nonobstructive stone in the left  kidney and questionable tiny nonobstructive 

stones in the right kidney.  Atherosclerotic disease.  Lower lumbar interbody 

fusion..  


Patient admitted with diagnoses of abdominal pain  with  intractable vomiting, 

hypertension, HIV disease, chronic subdural hematoma.





CONSULTANTS:


ID specialist Dr. Corrales


GI specialist 


nephrologist Dr. Trejo


psychiatrist 


Pain specialist Dr. Dixon


 


[facility] COURSE: 


Patient admitted to Med Surg floor  and started on IV fluids.  


Patient initially kept nothing by mouth.  


GI consult was requested.  


Abdominal ultrasound showed cholelithiasis without evidence of dilated ducts.  

Nonobstructive bilateral nephrolithiasis.  


GI cocktail was on board as needed.  


Antiemetics provided symptomatically.  


Patient slowly started on diet,   and diet was advanced  as tolerated.  


GI prophylaxis with PPI provided.  


Bowel regimen was instituted.  Patient had a bowel movement.  


Pain management was addressed and pain was controlled. 


Pain specialist closely followed.  


Patient  with  history of failed back surgery  syndrome as well as  extensive 

lumbar radiculopathy and cervical degenerative disc disease with spondylosis.


MRI of L-spine revealed degenerative changes at L2-3 and L3-4,  resulting in 

mild spinal canal and neural foraminal compromise, unchanged from earlier 

studies.  


Pain management was provided as per pain specialist recommendations.  Pain was 

controlled with current regimen.


Fall precautions were maintained.  Patient was working with  physical and 

occupational therapists.


Supplemental oxygen provided as needed to keep pulse oximetry above 92%.  


Pulmonary toilet was on standby as needed.  


Blood pressure was managed with beta blocker.  


Antiplatelet therapy with Plavix was continued.  


DVT prophylaxis provided.  


Infectious disease doctor closely followed.  


Per ID specialist, patient likely had gastroenteritis versus food poisoning.  


Ultrasound of the abdomen as mentioned above , showed no significant findings.  


Patient was  continue with antiretroviral therapy.  


Supportive care provided.  


Patient was kept off antibiotics.  


No fevers, initial mild leukocytosis resolved.


Patient noted to have acute renal failure:  on 9/29 BUN 42 ,creatinine 1.7.


Patient  was on  generous IV hydration.  


Renal ultrasound revealed no evidence of hydronephrosis with   normal bilateral 

kidneys echogenicity.  


Renal parameters  and electrolytes were closely monitored .


Nephrologist closely followed.  Urine studies were done. 


Nephrotoxins were avoided.  


Renal parameters were trending down


At the time  of discharge creatinine 1.0 BUN 11.  


Acute renal failure was probably  precipitated by dehydration.  


Psychiatrist seen and evaluated patient .


Reality orientation and supportive therapy provided.  


Patient clinically improved , was able to tolerate diet,  vomiting resolved, 

hemodynamically stable .


Patient was stable for discharge with  outpatient follow-up with   primary care 

provider in one week.





FINAL DIAGNOSES: 


Abdominal pain with intractable vomiting


Likely viral gastroenteritis 


Possible food poisoning 


Diverticulosis 


Constipation 


Hypertension 


COPD


Acute renal failure- resolved 


Dehydration 


HIV disease


Chronic subdural hematoma 


Intractable back pain 


DDD L-spine 


DDD C-spine 


Lumbar herniated disc 


Lumbar radiculopathy


Lumbar spondylosis 


Cervical spondylosis 


Failed back surgery syndrome


Major depression disorder


Anxiety disorder





DISCHARGE MEDICATIONS:


See Medication Reconciliation list.





DISCHARGE INSTRUCTIONS:


Patient was discharged home .


Follow up with primary care provider in one week.


I have been assigned to dictate discharge summary for this account. I was not 

involved in the patient's management.











Natalie Govea NP Oct 3, 2018 07:15

## 2018-10-05 NOTE — CARDIOLOGY REPORT
--------------- APPROVED REPORT --------------





EKG Measurement

Heart [id number]

OR [id number]

[id number]

[id number]

[id number]





Sinus tachycardia

Biatrial enlargement

Anterior infarct, age undetermined

Abnormal ECG

## 2018-12-27 ENCOUNTER — HOSPITAL ENCOUNTER (EMERGENCY)
Dept: HOSPITAL 72 - EMR | Age: 73
Discharge: HOME | End: 2018-12-27
Payer: MEDICARE

## 2018-12-27 VITALS — DIASTOLIC BLOOD PRESSURE: 90 MMHG | SYSTOLIC BLOOD PRESSURE: 161 MMHG

## 2018-12-27 VITALS — BODY MASS INDEX: 24.64 KG/M2 | HEIGHT: 71 IN | WEIGHT: 176 LBS

## 2018-12-27 VITALS — DIASTOLIC BLOOD PRESSURE: 78 MMHG | SYSTOLIC BLOOD PRESSURE: 125 MMHG

## 2018-12-27 DIAGNOSIS — Z86.73: ICD-10-CM

## 2018-12-27 DIAGNOSIS — J18.9: Primary | ICD-10-CM

## 2018-12-27 DIAGNOSIS — J44.9: ICD-10-CM

## 2018-12-27 DIAGNOSIS — I10: ICD-10-CM

## 2018-12-27 PROCEDURE — 99283 EMERGENCY DEPT VISIT LOW MDM: CPT

## 2018-12-27 NOTE — EMERGENCY ROOM REPORT
History of Present Illness


General


Chief Complaint:  Upper Respiratory Illness


Source:  Patient, EMS





Present Illness


HPI


74 YO Male presents to the ED c/o dry cough and chest congestion x 1 week, just 

had Sinusitis x 2 weeks. pt. has hx of immune compromise: HIV. 


Denies sore throat, ear pain, high fevers, lethargy, neck pain/stiffness, 

irritability, photophobia dehydration, N/V/D. Denies Cp, Palpitations, LOC, AMS

, seizures, paresthesias, or changes in Hearing or vision, no Sudden severe HA. 

Denies hx of smoking, asthma or COPD.


Allergies:  


Coded Allergies:  


     ACETAMINOPHEN (Verified  Allergy, Unknown, 3/24/10)


     ASPIRIN (Verified  Allergy, Unknown, 12/7/09)


     HYDROCODONE (Verified  Allergy, Unknown, 3/24/10)


     MORPHINE (Verified  Allergy, Unknown, 12/7/09)





Patient History


Past Medical History:  see triage record, HIV


Past Surgical History:  none


Pertinent Family History:  none


Immunizations:  UTD


Reviewed Nursing Documentation:  PSxH: Agreed





Nursing Documentation-PMH


Hx Cardiac Problems:  Yes


Hx Hypertension:  Yes


Hx Pacemaker:  No


Hx Asthma:  Yes


Hx COPD:  Yes


Hx Diabetes:  No


Hx Cancer:  No


Hx Gastrointestinal Problems:  Yes


Hx Dialysis:  No


Hx Neurological Problems:  No


Hx Cerebrovascular Accident:  Yes


Hx Dementia:  No


Hx Alzheimer's Disease:  No


Hx Parkinson's Disease:  No


Hx Seizures:  No


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


Hx Weakness:  Yes


Hx Fatigue:  Yes


Hx Brain Shunt:  Yes





Review of Systems


All Other Systems:  negative except mentioned in HPI





Physical Exam





Vital Signs








  Date Time  Temp Pulse Resp B/P (MAP) Pulse Ox O2 Delivery O2 Flow Rate FiO2


 


12/27/18 12:52 98.4 64 20 161/90 95 Room Air  








Sp02 EP Interpretation:  reviewed, normal


General Appearance:  no apparent distress, alert, GCS 15, non-toxic


Head:  normocephalic, atraumatic


Eyes:  bilateral eye normal inspection, bilateral eye PERRL


ENT:  hearing grossly normal, normal voice


Neck:  full range of motion


Respiratory:  chest non-tender, lungs clear, normal breath sounds, no 

respiratory distress, no accessory muscle use, no wheezing, speaking full 

sentences


Cardiovascular #1:  regular rate, rhythm, no edema, normal capillary refill


Gastrointestinal:  non tender, soft


Musculoskeletal:  back normal, gait/station normal, normal range of motion, non-

tender


Neurologic:  alert, oriented x3, responsive, motor strength/tone normal, 

sensory intact, normal gait, speech normal, grossly normal


Psychiatric:  judgement/insight normal


Skin:  normal color, no rash, warm/dry, well hydrated


Lymphatic:  no adenopathy





Medical Decision Making


PA Attestation


Dr. Contreras is my supervising Physician whom patient management has been 

discussed with.


Diagnostic Impression:  


 Primary Impression:  


 Atypical pneumonia


ER Course


74 YO Male presents to the ED c/o dry cough and chest congestion x 1 week, just 

had Sinusitis x 2 weeks. pt. has hx of immune compromise: HIV. 


Denies sore throat, ear pain, high fevers, lethargy, neck pain/stiffness, 

irritability, photophobia dehydration, N/V/D. Denies Cp, Palpitations, LOC, AMS

, seizures, paresthesias, or changes in Hearing or vision, no Sudden severe HA. 

Denies hx of smoking, asthma or COPD.








Ddx considered but are not limited to URI, pneumonia, PE, strep pharyngitis, 

meningitis.





Vital signs: Pt.is afebrile VS are WNL





H&PE are most consistent with atypical pneumonia, - mucus production with 

persistent cough in pt. with hx of immune compromise. 





ORDERS: none required at this time, the diagnosis is clinical





ED INTERVENTIONS: None required at this time. 





DISCHARGE: At this time pt. is stable for d/c to home. Will provide printed 

patient care instructions, and any necessary prescriptions. Care plan and 

follow up instructions have been discussed with the patient prior to discharge.





Last Vital Signs








  Date Time  Temp Pulse Resp B/P (MAP) Pulse Ox O2 Delivery O2 Flow Rate FiO2


 


12/27/18 13:25 98.4 77 20 161/90 95 Room Air  








Disposition:  HOME, SELF-CARE


Condition:  Stable


Scripts


Levofloxacin* (LEVAQUIN*) 750 Mg Tablet


750 MG ORAL DAILY, #7 TAB


   Prov: Stella Schwartz         12/27/18 


Guaifenesin (Guaifenesin) 1,200 Mg Tab.er.12h


1200 MG PO BID for 10 Days, #20 TAB


   Prov: Stella Schwartz         12/27/18 


D-Methorphan Hb/Prometh Hcl* (PROMETHAZINE-DM SYRUP*) 118 Ml Syrup


5 ML ORAL Q6H PRN for For Cough, #120 ML 0 Refills


   Prov: Stella Schwartz         12/27/18


Patient Instructions:  Upper Respiratory Infection, Adult





Additional Instructions:  


Take medications as directed. 


 ** Follow up with a Primary Care Provider in 3-5 days, even if your symptoms 

have resolved. ** 


--Please review list of primary care clinics, if you do not already have a 

primary care provider





Return sooner to ED if new symptoms occur, or current symptoms become worse. 











- Please note that this Emergency Department Report was dictated using [employer] technology software, occasionally this can lead to 

erroneous entry secondary to interpretation by the dictation equipment.











Stella Schwartz Dec 27, 2018 13:31

## 2019-04-14 ENCOUNTER — HOSPITAL ENCOUNTER (EMERGENCY)
Dept: HOSPITAL 72 - EMR | Age: 74
Discharge: HOME | End: 2019-04-14
Payer: MEDICARE

## 2019-04-14 VITALS — SYSTOLIC BLOOD PRESSURE: 153 MMHG | DIASTOLIC BLOOD PRESSURE: 89 MMHG

## 2019-04-14 VITALS — BODY MASS INDEX: 24.78 KG/M2 | WEIGHT: 177 LBS | HEIGHT: 71 IN

## 2019-04-14 VITALS — DIASTOLIC BLOOD PRESSURE: 95 MMHG | SYSTOLIC BLOOD PRESSURE: 165 MMHG

## 2019-04-14 VITALS — DIASTOLIC BLOOD PRESSURE: 87 MMHG | SYSTOLIC BLOOD PRESSURE: 150 MMHG

## 2019-04-14 DIAGNOSIS — Z86.73: ICD-10-CM

## 2019-04-14 DIAGNOSIS — Z95.9: ICD-10-CM

## 2019-04-14 DIAGNOSIS — Z88.6: ICD-10-CM

## 2019-04-14 DIAGNOSIS — I10: ICD-10-CM

## 2019-04-14 DIAGNOSIS — J44.1: Primary | ICD-10-CM

## 2019-04-14 LAB
ADD MANUAL DIFF: NO
ALBUMIN SERPL-MCNC: 4.1 G/DL (ref 3.4–5)
ALBUMIN/GLOB SERPL: 1 {RATIO} (ref 1–2.7)
ALP SERPL-CCNC: 104 U/L (ref 46–116)
ALT SERPL-CCNC: 31 U/L (ref 12–78)
ANION GAP SERPL CALC-SCNC: 6 MMOL/L (ref 5–15)
AST SERPL-CCNC: 22 U/L (ref 15–37)
BASOPHILS NFR BLD AUTO: 0.8 % (ref 0–2)
BILIRUB SERPL-MCNC: 0.3 MG/DL (ref 0.2–1)
BUN SERPL-MCNC: 13 MG/DL (ref 7–18)
CALCIUM SERPL-MCNC: 9.4 MG/DL (ref 8.5–10.1)
CHLORIDE SERPL-SCNC: 100 MMOL/L (ref 98–107)
CK MB SERPL-MCNC: 3.2 NG/ML (ref 0–3.6)
CK SERPL-CCNC: 242 U/L (ref 26–308)
CO2 SERPL-SCNC: 32 MMOL/L (ref 21–32)
CREAT SERPL-MCNC: 1.2 MG/DL (ref 0.55–1.3)
EOSINOPHIL NFR BLD AUTO: 4.5 % (ref 0–3)
ERYTHROCYTE [DISTWIDTH] IN BLOOD BY AUTOMATED COUNT: 12 % (ref 11.6–14.8)
GLOBULIN SER-MCNC: 4.2 G/DL
HCT VFR BLD CALC: 45.1 % (ref 42–52)
HGB BLD-MCNC: 14.6 G/DL (ref 14.2–18)
LYMPHOCYTES NFR BLD AUTO: 26.3 % (ref 20–45)
MCV RBC AUTO: 104 FL (ref 80–99)
MONOCYTES NFR BLD AUTO: 7.2 % (ref 1–10)
NEUTROPHILS NFR BLD AUTO: 61.2 % (ref 45–75)
PLATELET # BLD: 200 K/UL (ref 150–450)
POTASSIUM SERPL-SCNC: 4.2 MMOL/L (ref 3.5–5.1)
RBC # BLD AUTO: 4.33 M/UL (ref 4.7–6.1)
SODIUM SERPL-SCNC: 137 MMOL/L (ref 136–145)
WBC # BLD AUTO: 6.2 K/UL (ref 4.8–10.8)

## 2019-04-14 PROCEDURE — 99284 EMERGENCY DEPT VISIT MOD MDM: CPT

## 2019-04-14 PROCEDURE — 82553 CREATINE MB FRACTION: CPT

## 2019-04-14 PROCEDURE — 93005 ELECTROCARDIOGRAM TRACING: CPT

## 2019-04-14 PROCEDURE — 85025 COMPLETE CBC W/AUTO DIFF WBC: CPT

## 2019-04-14 PROCEDURE — 94664 DEMO&/EVAL PT USE INHALER: CPT

## 2019-04-14 PROCEDURE — 96374 THER/PROPH/DIAG INJ IV PUSH: CPT

## 2019-04-14 PROCEDURE — 94640 AIRWAY INHALATION TREATMENT: CPT

## 2019-04-14 PROCEDURE — 84484 ASSAY OF TROPONIN QUANT: CPT

## 2019-04-14 PROCEDURE — 82550 ASSAY OF CK (CPK): CPT

## 2019-04-14 PROCEDURE — 36415 COLL VENOUS BLD VENIPUNCTURE: CPT

## 2019-04-14 PROCEDURE — 80053 COMPREHEN METABOLIC PANEL: CPT

## 2019-04-14 PROCEDURE — 71045 X-RAY EXAM CHEST 1 VIEW: CPT

## 2019-04-14 PROCEDURE — 83880 ASSAY OF NATRIURETIC PEPTIDE: CPT

## 2019-04-14 NOTE — DIAGNOSTIC IMAGING REPORT
EXAM:

  XR Chest, 1 View.

 

CLINICAL HISTORY:

  SOB

 

TECHNIQUE:

  Frontal view of the chest.

 

COMPARISON:

1/26/18

 

FINDINGS:

  Lungs: Lung volumes are within normal limits.  No airspace 

consolidation.  Mild left basilar scarring or atelectasis.

  Pleural spaces:  Unremarkable.  No pneumothorax.

  Heart:  Unremarkable.  No cardiomegaly.

  Mediastinum: No mediastinal widening or shift.

  Bones:  Unremarkable.  No acute fracture.

 

IMPRESSION:     

There is no evidence of active cardiopulmonary abnormality.

## 2019-04-14 NOTE — NUR
ED Nurse Note:



Pt came from home w/ complaints of SOB x 1 week but got worse last night. Pt 
has a hx of asthma and COPD. Pt tried his inhaler but was ineffective. Upon 
arrival, pt was sating at 100% on RA but ERMD ordered to placed pt on 2L of 
oxygen via nasal cannula. Pt denies any complaints of pain. Pt is A + O x4. 
Ambulatory. Skin warm to touch.

## 2019-04-14 NOTE — EMERGENCY ROOM REPORT
History of Present Illness


General


Chief Complaint:  Dyspnea/Respdistress


Source:  Patient





Present Illness


HPI


Patient is a 74-year-old male who presented after increased difficulty with 

breathing.  Patient had no relief with his inhaler.  Patient reports patient 

reports having some increased chest tightness.  He states this was unrelieved 

by his inhalers.  Patient had not been vomiting or having any fever.  He had 

onset of symptoms approximate 1 week ago.  He reports having prior history of 

COPD and childhood asthma.  He states he quit smoking approximately 6 months 

ago.


Allergies:  


Coded Allergies:  


     ACETAMINOPHEN (Verified  Allergy, Unknown, 3/24/10)


     ASPIRIN (Verified  Allergy, Unknown, 12/7/09)


     HYDROCODONE (Verified  Allergy, Unknown, 3/24/10)


     MORPHINE (Verified  Allergy, Unknown, 12/7/09)





Patient History


Past Medical History:  see triage record, asthma, COPD


Reviewed Nursing Documentation:  PMH: Agreed; PSxH: Agreed





Nursing Documentation-PMH


Hx Cardiac Problems:  Yes


Hx Hypertension:  Yes


Hx Pacemaker:  No


Hx Asthma:  Yes


Hx COPD:  Yes


Hx Diabetes:  No


Hx Cancer:  No


Hx Gastrointestinal Problems:  Yes


Hx Dialysis:  No


Hx Neurological Problems:  No


Hx Cerebrovascular Accident:  Yes


Hx Dementia:  No


Hx Alzheimer's Disease:  No


Hx Parkinson's Disease:  No


Hx Seizures:  No


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


Hx Weakness:  Yes


Hx Fatigue:  Yes


Hx Brain Shunt:  Yes





Review of Systems


All Other Systems:  negative except mentioned in HPI





Physical Exam





Vital Signs








  Date Time  Temp Pulse Resp B/P (MAP) Pulse Ox O2 Delivery O2 Flow Rate FiO2


 


4/14/19 06:50 97.9 86 22 139/78 94 Room Air  








Sp02 EP Interpretation:  reviewed, normal


General Appearance:  normal inspection, alert, GCS 15, thin, Chronically Ill


Head:  atraumatic


ENT:  normal ENT inspection, hearing grossly normal, normal voice


Neck:  normal inspection, full range of motion, supple, no bony tend


Respiratory:  normal inspection, no respiratory distress, no retraction, 

wheezing


Cardiovascular #1:  regular rate, rhythm, no edema


Gastrointestinal:  normal inspection, normal bowel sounds, non tender, soft, no 

guarding, no hernia


Genitourinary:  no CVA tenderness


Musculoskeletal:  normal inspection, back normal, normal range of motion


Neurologic:  normal inspection, alert, oriented x3, responsive, CNs III-XII nml 

as tested, speech normal


Psychiatric:  normal inspection, judgement/insight normal, mood/affect normal


Skin:  normal inspection, normal color, no rash





Medical Decision Making


Diagnostic Impression:  


 Primary Impression:  


 COPD exacerbation


ER Course


Patient presented for shortness of breath.  Differential included but was not 

limited to anemia, pneumonia, pneumothorax, myocardial infarction, pericardial 

effusion, congestive heart failure, acidosis.  Because of complexity of patient'

s case laboratory testing and imaging studies were ordered.  EKG interpreted by 

me showed normal sinus rhythm with a rate of 70 without acute ST or T wave 

changes.  Patient was noted to have what appears to be a COPD exacerbation.  

Patient noted to have normal oxygen saturation.  He was given breathing 

treatment as well as IV steroids.Chest x-ray 1 view read by radiology showed no 

acute process.  Patient was noted to have laboratory testing with adequate 

hemoglobin.  Patient's troponin was negative.  He was given prescription for 

oral steroids as well as inhaler.  He was advised to follow-up with his primary 

care physician for recheck.





Labs








Test


  4/14/19


07:13


 


White Blood Count


  6.2 K/UL


(4.8-10.8)


 


Red Blood Count


  4.33 M/UL


(4.70-6.10)


 


Hemoglobin


  14.6 G/DL


(14.2-18.0)


 


Hematocrit


  45.1 %


(42.0-52.0)


 


Mean Corpuscular Volume 104 FL (80-99) 


 


Mean Corpuscular Hemoglobin


  33.8 PG


(27.0-31.0)


 


Mean Corpuscular Hemoglobin


Concent 32.5 G/DL


(32.0-36.0)


 


Red Cell Distribution Width


  12.0 %


(11.6-14.8)


 


Platelet Count


  200 K/UL


(150-450)


 


Mean Platelet Volume


  5.2 FL


(6.5-10.1)


 


Neutrophils (%) (Auto)


  61.2 %


(45.0-75.0)


 


Lymphocytes (%) (Auto)


  26.3 %


(20.0-45.0)


 


Monocytes (%) (Auto)


  7.2 %


(1.0-10.0)


 


Eosinophils (%) (Auto)


  4.5 %


(0.0-3.0)


 


Basophils (%) (Auto)


  0.8 %


(0.0-2.0)


 


Sodium Level


  137 MMOL/L


(136-145)


 


Potassium Level


  4.2 MMOL/L


(3.5-5.1)


 


Chloride Level


  100 MMOL/L


()


 


Carbon Dioxide Level


  32 MMOL/L


(21-32)


 


Anion Gap


  6 mmol/L


(5-15)


 


Blood Urea Nitrogen


  13 mg/dL


(7-18)


 


Creatinine


  1.2 MG/DL


(0.55-1.30)


 


Estimat Glomerular Filtration


Rate  mL/min (>60) 


 


 


Glucose Level


  89 MG/DL


()


 


Calcium Level


  9.4 MG/DL


(8.5-10.1)


 


Total Bilirubin


  0.3 MG/DL


(0.2-1.0)


 


Aspartate Amino Transf


(AST/SGOT) 22 U/L (15-37) 


 


 


Alanine Aminotransferase


(ALT/SGPT) 31 U/L (12-78) 


 


 


Alkaline Phosphatase


  104 U/L


()


 


Total Creatine Kinase


  242 U/L


()


 


Creatine Kinase MB


  3.2 NG/ML


(0.0-3.6)


 


Creatine Kinase MB Relative


Index 1.3 


 


 


Troponin I


  0.007 ng/mL


(0.000-0.056)


 


Pro-B-Type Natriuretic Peptide


  203 pg/mL


(0-125)


 


Total Protein


  8.3 G/DL


(6.4-8.2)


 


Albumin


  4.1 G/DL


(3.4-5.0)


 


Globulin 4.2 g/dL 


 


Albumin/Globulin Ratio 1.0 (1.0-2.7) 








EKG Diagnostic Results


Rate:  normal - 70


Rhythm:  NSR


ST Segments:  no acute changes





Last Vital Signs








  Date Time  Temp Pulse Resp B/P (MAP) Pulse Ox O2 Delivery O2 Flow Rate FiO2


 


4/14/19 06:50 97.9 86 22 139/78 94 Room Air  








Status:  improved


Disposition:  HOME, SELF-CARE


Condition:  Stable


Scripts


Prednisone* (PREDNISONE*) 20 Mg Tablet


40 MG ORAL DAILY, #10 TAB


   Prov: Evangelista Drew MD         4/14/19 


Albuterol Sulfate* (PROAIR HFA*) 8.5 Gm Hfa.aer.ad


2 PUFFS INH Q6H, #8.5 GM 0 Refills


   Prov: Evangelista Drew MD         4/14/19











Evangelista Drew MD Apr 14, 2019 07:03

## 2019-04-16 NOTE — CARDIOLOGY REPORT
--------------- APPROVED REPORT --------------





EKG Measurement

Heart [id number]

NH [id number]

[id number]

[id number]

[id number]





Normal sinus rhythm

Normal ECG

## 2019-07-17 ENCOUNTER — HOSPITAL ENCOUNTER (EMERGENCY)
Dept: HOSPITAL 72 - EMR | Age: 74
Discharge: HOME | End: 2019-07-17
Payer: MEDICARE

## 2019-07-17 VITALS — DIASTOLIC BLOOD PRESSURE: 99 MMHG | SYSTOLIC BLOOD PRESSURE: 177 MMHG

## 2019-07-17 VITALS — HEIGHT: 71 IN | BODY MASS INDEX: 24.92 KG/M2 | WEIGHT: 178 LBS

## 2019-07-17 VITALS — SYSTOLIC BLOOD PRESSURE: 180 MMHG | DIASTOLIC BLOOD PRESSURE: 103 MMHG

## 2019-07-17 DIAGNOSIS — R51: ICD-10-CM

## 2019-07-17 DIAGNOSIS — R42: ICD-10-CM

## 2019-07-17 DIAGNOSIS — Z88.6: ICD-10-CM

## 2019-07-17 DIAGNOSIS — K29.70: Primary | ICD-10-CM

## 2019-07-17 DIAGNOSIS — J44.9: ICD-10-CM

## 2019-07-17 DIAGNOSIS — Z76.5: ICD-10-CM

## 2019-07-17 DIAGNOSIS — R10.9: ICD-10-CM

## 2019-07-17 DIAGNOSIS — Z86.73: ICD-10-CM

## 2019-07-17 LAB
ADD MANUAL DIFF: NO
ALBUMIN SERPL-MCNC: 4.5 G/DL (ref 3.4–5)
ALBUMIN/GLOB SERPL: 1 {RATIO} (ref 1–2.7)
ALP SERPL-CCNC: 110 U/L (ref 46–116)
ALT SERPL-CCNC: 19 U/L (ref 12–78)
ANION GAP SERPL CALC-SCNC: 12 MMOL/L (ref 5–15)
APTT BLD: 23 SEC (ref 23–33)
AST SERPL-CCNC: 23 U/L (ref 15–37)
BASOPHILS NFR BLD AUTO: 0.4 % (ref 0–2)
BILIRUB SERPL-MCNC: 0.5 MG/DL (ref 0.2–1)
BUN SERPL-MCNC: 8 MG/DL (ref 7–18)
CALCIUM SERPL-MCNC: 9.3 MG/DL (ref 8.5–10.1)
CHLORIDE SERPL-SCNC: 102 MMOL/L (ref 98–107)
CK MB SERPL-MCNC: 2.1 NG/ML (ref 0–3.6)
CK SERPL-CCNC: 221 U/L (ref 26–308)
CO2 SERPL-SCNC: 25 MMOL/L (ref 21–32)
CREAT SERPL-MCNC: 0.8 MG/DL (ref 0.55–1.3)
EOSINOPHIL NFR BLD AUTO: 0 % (ref 0–3)
ERYTHROCYTE [DISTWIDTH] IN BLOOD BY AUTOMATED COUNT: 11.3 % (ref 11.6–14.8)
GLOBULIN SER-MCNC: 4.4 G/DL
HCT VFR BLD CALC: 47.8 % (ref 42–52)
HGB BLD-MCNC: 16.1 G/DL (ref 14.2–18)
INR PPP: 0.9 (ref 0.9–1.1)
LYMPHOCYTES NFR BLD AUTO: 9.6 % (ref 20–45)
MCV RBC AUTO: 103 FL (ref 80–99)
MONOCYTES NFR BLD AUTO: 2 % (ref 1–10)
NEUTROPHILS NFR BLD AUTO: 88 % (ref 45–75)
PLATELET # BLD: 216 K/UL (ref 150–450)
POTASSIUM SERPL-SCNC: 3.8 MMOL/L (ref 3.5–5.1)
RBC # BLD AUTO: 4.66 M/UL (ref 4.7–6.1)
SODIUM SERPL-SCNC: 138 MMOL/L (ref 136–145)
WBC # BLD AUTO: 8.6 K/UL (ref 4.8–10.8)

## 2019-07-17 PROCEDURE — 86850 RBC ANTIBODY SCREEN: CPT

## 2019-07-17 PROCEDURE — 82553 CREATINE MB FRACTION: CPT

## 2019-07-17 PROCEDURE — 93005 ELECTROCARDIOGRAM TRACING: CPT

## 2019-07-17 PROCEDURE — 80053 COMPREHEN METABOLIC PANEL: CPT

## 2019-07-17 PROCEDURE — 99284 EMERGENCY DEPT VISIT MOD MDM: CPT

## 2019-07-17 PROCEDURE — 83690 ASSAY OF LIPASE: CPT

## 2019-07-17 PROCEDURE — 85025 COMPLETE CBC W/AUTO DIFF WBC: CPT

## 2019-07-17 PROCEDURE — 85610 PROTHROMBIN TIME: CPT

## 2019-07-17 PROCEDURE — 86901 BLOOD TYPING SEROLOGIC RH(D): CPT

## 2019-07-17 PROCEDURE — 84484 ASSAY OF TROPONIN QUANT: CPT

## 2019-07-17 PROCEDURE — 82550 ASSAY OF CK (CPK): CPT

## 2019-07-17 PROCEDURE — 36415 COLL VENOUS BLD VENIPUNCTURE: CPT

## 2019-07-17 PROCEDURE — 85730 THROMBOPLASTIN TIME PARTIAL: CPT

## 2019-07-17 PROCEDURE — 80329 ANALGESICS NON-OPIOID 1 OR 2: CPT

## 2019-07-17 PROCEDURE — 71045 X-RAY EXAM CHEST 1 VIEW: CPT

## 2019-07-17 PROCEDURE — 96374 THER/PROPH/DIAG INJ IV PUSH: CPT

## 2019-07-17 PROCEDURE — 86900 BLOOD TYPING SEROLOGIC ABO: CPT

## 2019-07-17 NOTE — NUR
ED Nurse Note:

Patient refused Toradol at this time. Patient is requesting Dilaudid. PA made 
aware.

## 2019-07-17 NOTE — NUR
ED Nurse Note:

bedside abd us being done by tech after md browne.  pt without active n/v/d, but 
does relate abd pain

## 2019-07-17 NOTE — EMERGENCY ROOM REPORT
History of Present Illness


General


Chief Complaint:  Abdominal Pain


Source:  Patient


 (Tay Wilkinson)





Present Illness


HPI


74-year-old male with history of COPD exacerbation alcohol intake and drug-

seeking here complaining of acute onset of epigastric abdominal pain and 

multiple bouts of nonbloody emesis.  According to wife patient did have some 

spicy food last night.  Patient denies chest pain, shortness of breath, 

palpitation, diarrhea and constipation.  Denies blood in stool.  Patient is 

rating the pain 10 out of 10 without radiation complaining of dizziness and 

headache appears with peristalsis of abdomen.  Denies trauma to the abdomen, 

urinary symptoms, head injury.  Has not taken medication for alleviation of his 

symptoms.  Ultrasound at bedside is done to rule out possibility of abdominal 

aortic aneurysm however after Dr. Brown contacted the ultrasound AAA was 

ruled out.  Order for CT abdomen with contrast was put in however patient keeps 

saying that he is allergic to all contrast.  He keeps asking for specific pain 

medication saying that he is allergic to morphine, and hydrocodone and he wants 

Dilaudid.  Cures was done and patient patient has extensive history of 

oxycodone use as well as methadone.  She refuses to take Toradol as is been 

ordered for him.  Refuses to do CT scan before he is given any pain medication.

  He also later asks me to give him morphine when he realizes that Dilaudid is 

not going to be given and he says that last time he was given morphine UA 

showed allergic reaction as it may have not been mixed with the correct 

solution.  Drug-seeking behavior is observed patient keeps insisting on seeing 

the ER physician walks to the counter to talk to the ER physician.  At this 

time patient focuses on intake of pain medication more so than abdominal pain.  

Blood work was done EKG and chest x-ray as well as troponin all within normal 

limits.


 (Tay Wilkinson)


Allergies:  


Coded Allergies:  


     ACETAMINOPHEN (Verified  Allergy, Unknown, 3/24/10)


     ASPIRIN (Verified  Allergy, Unknown, 7/17/19)


     HYDROCODONE (Verified  Allergy, Unknown, 3/24/10)


     IODINE (Verified  Allergy, Unknown, 7/17/19)


     MORPHINE (Verified  Allergy, Unknown, 7/17/19)





Patient History


Past Medical History:  see triage record


Past Surgical History:  unable to obtain


Pertinent Family History:  unable to obtain


Social History:  Reports: smoking, alcohol use


Immunizations:  UTD


Reviewed Nursing Documentation:  PMH: Agreed; PSxH: Agreed (Tay Wilkinson)





Nursing Documentation-PMH


Past Medical History:  No History, Except For


Hx Cardiac Problems:  Yes


Hx Hypertension:  Yes


Hx Pacemaker:  No


Hx Asthma:  Yes


Hx COPD:  Yes


Hx Diabetes:  No


Hx Cancer:  No


Hx Gastrointestinal Problems:  Yes


Hx Dialysis:  No


Hx Neurological Problems:  No


Hx Cerebrovascular Accident:  Yes


Hx Dementia:  No


Hx Alzheimer's Disease:  No


Hx Parkinson's Disease:  No


Hx Seizures:  No


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


Hx Weakness:  Yes


Hx Fatigue:  Yes


Hx Brain Shunt:  Yes


 (Tay Wilkinson)





Review of Systems


All Other Systems:  negative except mentioned in HPI


 (Tay Wilkinson)





Physical Exam





Vital Signs








  Date Time  Temp Pulse Resp B/P (MAP) Pulse Ox O2 Delivery O2 Flow Rate FiO2


 


7/17/19 15:03 99.1 99 18 180/103 (128) 98 Room Air  








Sp02 EP Interpretation:  normal


General Appearance:  normal inspection, alert, GCS 15, non-toxic, mild distress


Head:  normocephalic, atraumatic


Eyes:  bilateral eye normal inspection, bilateral eye PERRL


ENT:  normal ENT inspection, hearing grossly normal, normal pharynx, no 

angioedema


Neck:  normal inspection, full range of motion, supple, thyroid normal, no 

meningismus


Respiratory:  normal inspection, chest non-tender, lungs clear, normal breath 

sounds, no rhonchi, no respiratory distress, no retraction, no wheezing


Cardiovascular #1:  normal inspection, regular rate, rhythm, no edema, no gallop

, no JVD, no murmur


Gastrointestinal:  normal bowel sounds, non tender, no peritonitis, no bruit, 

non-distended, no guarding, no hernia, no pulsatile mass, other - Peristalsis 

of abdomen noted


Rectal:  deferred


Genitourinary:  no CVA tenderness


Musculoskeletal:  normal inspection, back normal, digits/nails normal, gait/

station normal


Neurologic:  normal inspection, alert, oriented x3, responsive, CNs III-XII nml 

as tested


Psychiatric:  normal inspection, judgement/insight normal, memory normal


Skin:  no rash, normal color


Lymphatic:  normal inspection, no adenopathy


 (Tay Wilkinson)





Medical Decision Making


PA Attestation


Diagnosis and treatment plans were reviewed and discussed with my supervising 

physician Dr. Brown


 (Jaja,Tay WADDELL)


Medicare Attestation


I saw and evaluated the patient and discussed the care with Tay WADDELL. I agree with the findings and plan as documented in the 

note.





Bedside US showed normal aorta, pt uncooperative with obtaining CT, patient 

bargaining for pain medications. 


Patient refused CT, refused toradol. Pt in no acute distress, with soft abdomen

, ambulating and not in any pain. 


Patient on examination was more concerned with obtaining a specific type of 

pain medication. 


 (Samuel Brown M.D.)


Diagnostic Impression:  


 Primary Impression:  


 Gastritis


 Additional Impressions:  


 Abdominal pain of unknown etiology


 Drug-seeking behavior


ER Course


74-year-old male with history of COPD exacerbation alcohol intake and drug-

seeking here complaining of acute onset of epigastric abdominal pain and 

multiple bouts of nonbloody emesis.  According to wife patient did have some 

spicy food last night.  Patient denies chest pain, shortness of breath, 

palpitation, diarrhea and constipation.  Denies blood in stool.  Patient is 

rating the pain 10 out of 10 without radiation complaining of dizziness and 

headache appears with peristalsis of abdomen.  Denies trauma to the abdomen, 

urinary symptoms, head injury.  Has not taken medication for alleviation of his 

symptoms.  Ultrasound at bedside is done to rule out possibility of abdominal 

aortic aneurysm however after Dr. Brown contacted the ultrasound AAA was 

ruled out.  Order for CT abdomen with contrast was put in however patient keeps 

saying that he is allergic to all contrast.  He keeps asking for specific pain 

medication saying that he is allergic to morphine, and hydrocodone and he wants 

Dilaudid.  Cures was done and patient patient has extensive history of 

oxycodone use as well as methadone.  She refuses to take Toradol as is been 

ordered for him.  Refuses to do CT scan before he is given any pain medication.

  He also later asks me to give him morphine when he realizes that Dilaudid is 

not going to be given and he says that last time he was given morphine UA 

showed allergic reaction as it may have not been mixed with the correct 

solution.  Drug-seeking behavior is observed patient keeps insisting on seeing 

the ER physician walks to the counter to talk to the ER physician.  At this 

time patient focuses on intake of pain medication more so than abdominal pain.  

Blood work was done EKG and chest x-ray as well as troponin all within normal 

limits.





Ddx considered but are not limited to: appendicitis, cholecystis, gastritis, 

gastroenteritis, UTI, pyelonephritis, SBO, diverticulitis, influenza with GI 

manifestation, MI, drug-seeking behavior, gastritis,











Vital signs: are WNL, pt. is afebrile








 H&PE are most consistent with: Abdominal pain of unknown etiology, gastritis, 

drug-seeking behavior














ORDERS: abdominal CT, abdominal pain set, EKG, bedside abdominal ultrasound, 

Toradol, IV fluids











ED INTERVENTIONS: Toradol, IV fluids














DISCHARGE: At this time pt. is stable for d/c to home. Will provide printed 

patient care instructions, and any necessary prescriptions. Care plan and 

follow up instructions have been discussed with the patient prior to discharge.

  Patient to sign AMA as he refused a CT scan and keep seeking IV pain 

medication after talking to Dr. Brown patient to be discharged without 

getting a CT scan patient understands the risks of not having the CT scan done 

due to his abdominal pain at this time no risk factors noted for AAA 

presentation as patient is comfortable walking and arguing about pain 

medication.  Patient stable at time of discharge with stable vital signs.  IV 

fluids were administered.  Patient refused to do Toradol for pain.


 (Tay Wilkinson)





EKG Diagnostic Results


Rate:  normal


Rhythm:  NSR


ST Segments:  no acute changes


Other Impression


No acute ST changes


 (Tay Wilkinson)





Chest X-Ray Diagnostic Results


Chest X-Ray Diagnostic Results :  


   Chest X-Ray Ordered:  Yes


   # of Views/Limited/Complete:  1 View


   Indication:  Other


   EP Interpretation:  Yes


   PA Xray:  Interpretation reviewed, by supervising MD, and agrees with 

findings.


   Interpretation:  no consolidation, no effusion, no pneumothorax


   Impression:  No acute disease


   Electronically Signed by:  Tay Mcqueen PA-C


 (Tay Wilkinson)





Last Vital Signs








  Date Time  Temp Pulse Resp B/P (MAP) Pulse Ox O2 Delivery O2 Flow Rate FiO2


 


7/17/19 15:03 99.1 99 18 180/103 (128) 98 Room Air  








 (Tay Wilkinson)


Disposition:  HOME, SELF-CARE


Condition:  Stable


Patient Instructions:  Abdominal Pain, Adult





Additional Instructions:  


Follow-up with your pain management for more pain medication at this time since 

you are specifically asking for specific pain medications cannot be 

administered due to your extensive history of taking pain medications he 

refused to do CT scan of the abdomen however the rest of blood work was within 

normal limits follow-up with a primary care provider I consulted with my 

supervising physician and we both agree that patient can be discharged











Tay Wilkinson Jul 17, 2019 17:50


Samuel Brown M.D. Jul 18, 2019 09:49

## 2019-07-17 NOTE — DIAGNOSTIC IMAGING REPORT
Indication: Chest pain

 

Comparison:  4/14/2019

 

A single view chest radiograph was obtained.

 

Findings:

 

No definite infiltrate or pulmonary vascular congestion identified.  The heart is

normal in size. The aorta is mildly enlarged consistent with atherosclerotic vascular

disease.  The bones are osteopenic.

 

Impression:

 

No acute disease